# Patient Record
Sex: FEMALE | Race: WHITE | Employment: OTHER | URBAN - METROPOLITAN AREA
[De-identification: names, ages, dates, MRNs, and addresses within clinical notes are randomized per-mention and may not be internally consistent; named-entity substitution may affect disease eponyms.]

---

## 2017-04-20 ENCOUNTER — HOSPITAL ENCOUNTER (EMERGENCY)
Age: 82
Discharge: HOME | End: 2017-04-20
Attending: EMERGENCY MEDICINE
Payer: MEDICARE

## 2017-04-20 ENCOUNTER — APPOINTMENT (OUTPATIENT)
Dept: GENERAL RADIOLOGY | Age: 82
End: 2017-04-20
Attending: EMERGENCY MEDICINE
Payer: MEDICARE

## 2017-04-20 VITALS
TEMPERATURE: 97.8 F | RESPIRATION RATE: 16 BRPM | BODY MASS INDEX: 25.16 KG/M2 | HEIGHT: 63 IN | OXYGEN SATURATION: 95 % | SYSTOLIC BLOOD PRESSURE: 172 MMHG | WEIGHT: 141.98 LBS | DIASTOLIC BLOOD PRESSURE: 75 MMHG | HEART RATE: 59 BPM
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
## 2017-04-20 DIAGNOSIS — S80.12XA CONTUSION OF LEFT LOWER LEG, INITIAL ENCOUNTER: ICD-10-CM

## 2017-04-20 PROCEDURE — 99282 EMERGENCY DEPT VISIT SF MDM: CPT

## 2017-04-20 PROCEDURE — 73590 X-RAY EXAM OF LOWER LEG: CPT

## 2017-04-20 RX ORDER — ATORVASTATIN CALCIUM 10 MG/1
10 TABLET, FILM COATED ORAL DAILY
COMMUNITY

## 2017-04-20 RX ORDER — VALSARTAN 320 MG/1
320 TABLET ORAL DAILY
COMMUNITY
End: 2022-05-13

## 2017-04-20 NOTE — ED PROVIDER NOTES
HPI Comments: Possibly tripped on rug. Did not get dizzy or have chest pain. No LOC. Patient is a 80 y.o. female presenting with fall. The history is provided by the patient. Fall   The accident occurred 3 to 5 hours ago. The fall occurred while walking. She fell from a height of ground level. She landed on carpet. There was no blood loss. Point of impact: left leg. Pain location: left leg. The pain is mild. She was ambulatory at the scene. There was no entrapment after the fall. There was no drug use involved in the accident. There was no alcohol use involved in the accident. Pertinent negatives include no fever, no numbness, no abdominal pain, no nausea, no vomiting, no headaches, no loss of consciousness, no tingling and no laceration. The risk factors include being elderly. The symptoms are aggravated by activity. She has tried nothing for the symptoms. Past Medical History:   Diagnosis Date    Colitis     Hypertension        Past Surgical History:   Procedure Laterality Date    HX DILATION AND CURETTAGE           History reviewed. No pertinent family history. Social History     Social History    Marital status:      Spouse name: N/A    Number of children: N/A    Years of education: N/A     Occupational History    Not on file. Social History Main Topics    Smoking status: Former Smoker    Smokeless tobacco: Not on file    Alcohol use No    Drug use: Not on file    Sexual activity: Not on file     Other Topics Concern    Not on file     Social History Narrative         ALLERGIES: Macrobid [nitrofurantoin monohyd/m-cryst] and Pcn [penicillins]    Review of Systems   Constitutional: Negative for chills and fever. HENT: Negative for ear pain and sore throat. Eyes: Negative for pain. Respiratory: Negative for chest tightness and shortness of breath. Cardiovascular: Negative for chest pain and leg swelling.    Gastrointestinal: Negative for abdominal pain, nausea and vomiting. Genitourinary: Negative for dysuria and flank pain. Musculoskeletal: Negative for back pain. Skin: Negative for rash. Neurological: Negative for tingling, loss of consciousness, numbness and headaches. All other systems reviewed and are negative. There were no vitals filed for this visit. Physical Exam   Constitutional: She appears well-developed and well-nourished. HENT:   Head: Normocephalic and atraumatic. Mouth/Throat: Oropharynx is clear and moist.   Eyes: Conjunctivae and EOM are normal. Pupils are equal, round, and reactive to light. No scleral icterus. Neck: Neck supple. No tracheal deviation present. Cardiovascular: Normal rate, regular rhythm and normal heart sounds. Pulmonary/Chest: Effort normal and breath sounds normal. No respiratory distress. Abdominal: Soft. She exhibits no distension. There is no tenderness. There is no rebound and no guarding. Genitourinary:   Genitourinary Comments: deferred   Musculoskeletal: She exhibits no edema. Mild tenderness distal left leg   Neurological: She is alert. She exhibits normal muscle tone. Motor and sensation intact in lower extremities   Skin: Skin is warm and dry. No laceration noted. Psychiatric: She has a normal mood and affect. Nursing note and vitals reviewed. MDM  Number of Diagnoses or Management Options  Contusion of left lower leg, initial encounter:   Fall, initial encounter:   Diagnosis management comments: 80year old female presents after mechanical ground level fall.   Diff dx: contusion, fracture    X-ray NAD    A/P: fall, contusion- tylenol prn, ice, f/u with PCP for re-check and elevated BP  Return to the emergency department for new/ worsening symptoms or other concerns         Patient Progress  Patient progress: stable    ED Course       Procedures

## 2017-04-20 NOTE — ED NOTES
The patient was discharged home by Dr Lindsey Restrepo in stable condition. The patient is alert and oriented, in no respiratory distress and discharge vital signs obtained. The patient's diagnosis, condition and treatment were explained. The patient expressed understanding. A discharge plan has been developed. A  was not involved in the process. Aftercare instructions were given. Pt discharged from the ED via w/c by Ayaz Castro RN with family.

## 2017-04-20 NOTE — DISCHARGE INSTRUCTIONS
Preventing Falls: Care Instructions  Your Care Instructions  Getting around your home safely can be a challenge if you have injuries or health problems that make it easy for you to fall. Loose rugs and furniture in walkways are among the dangers for many older people who have problems walking or who have poor eyesight. People who have conditions such as arthritis, osteoporosis, or dementia also have to be careful not to fall. You can make your home safer with a few simple measures. Follow-up care is a key part of your treatment and safety. Be sure to make and go to all appointments, and call your doctor if you are having problems. It's also a good idea to know your test results and keep a list of the medicines you take. How can you care for yourself at home? Taking care of yourself  · You may get dizzy if you do not drink enough water. To prevent dehydration, drink plenty of fluids, enough so that your urine is light yellow or clear like water. Choose water and other caffeine-free clear liquids. If you have kidney, heart, or liver disease and have to limit fluids, talk with your doctor before you increase the amount of fluids you drink. · Exercise regularly to improve your strength, muscle tone, and balance. Walk if you can. Swimming may be a good choice if you cannot walk easily. · Have your vision and hearing checked each year or any time you notice a change. If you have trouble seeing and hearing, you might not be able to avoid objects and could lose your balance. · Know the side effects of the medicines you take. Ask your doctor or pharmacist whether the medicines you take can affect your balance. Sleeping pills or sedatives can affect your balance. · Limit the amount of alcohol you drink. Alcohol can impair your balance and other senses. · Ask your doctor whether calluses or corns on your feet need to be removed.  If you wear loose-fitting shoes because of calluses or corns, you can lose your balance and fall. · Talk to your doctor if you have numbness in your feet. Preventing falls at home  · Remove raised doorway thresholds, throw rugs, and clutter. Repair loose carpet or raised areas in the floor. · Move furniture and electrical cords to keep them out of walking paths. · Use nonskid floor wax, and wipe up spills right away, especially on ceramic tile floors. · If you use a walker or cane, put rubber tips on it. If you use crutches, clean the bottoms of them regularly with an abrasive pad, such as steel wool. · Keep your house well lit, especially Eleonore Guppy, and outside walkways. Use night-lights in areas such as hallways and bathrooms. Add extra light switches or use remote switches (such as switches that go on or off when you clap your hands) to make it easier to turn lights on if you have to get up during the night. · Install sturdy handrails on stairways. · Move items in your cabinets so that the things you use a lot are on the lower shelves (about waist level). · Keep a cordless phone and a flashlight with new batteries by your bed. If possible, put a phone in each of the main rooms of your house, or carry a cell phone in case you fall and cannot reach a phone. Or, you can wear a device around your neck or wrist. You push a button that sends a signal for help. · Wear low-heeled shoes that fit well and give your feet good support. Use footwear with nonskid soles. Check the heels and soles of your shoes for wear. Repair or replace worn heels or soles. · Do not wear socks without shoes on wood floors. · Walk on the grass when the sidewalks are slippery. If you live in an area that gets snow and ice in the winter, sprinkle salt on slippery steps and sidewalks. Preventing falls in the bath  · Install grab bars and nonskid mats inside and outside your shower or tub and near the toilet and sinks. · Use shower chairs and bath benches.   · Use a hand-held shower head that will allow you to sit while showering. · Get into a tub or shower by putting the weaker leg in first. Get out of a tub or shower with your strong side first.  · Repair loose toilet seats and consider installing a raised toilet seat to make getting on and off the toilet easier. · Keep your bathroom door unlocked while you are in the shower. Where can you learn more? Go to http://josh-merari.info/. Enter 0476 [id number] in the search box to learn more about \"Preventing Falls: Care Instructions. \"  Current as of: August 4, 2016  Content Version: 11.2  © 8189-7982 [employer]. Care instructions adapted under license by [employer] (which disclaims liability or warranty for this information). If you have questions about a medical condition or this instruction, always ask your healthcare professional. [place] [address] any warranty or liability for your use of this information. Contusion: Care Instructions  Your Care Instructions  Contusion is the medical term for a bruise. It is the result of a direct blow or an impact, such as a fall. Contusions are common sports injuries. Most people think of a bruise as a black-and-blue spot. This happens when small blood vessels get torn and leak blood under the skin. But bones, muscles, and organs can also get bruised. This may damage deep tissues but not cause a bruise you can see. The doctor will do a physical exam to find the location of your contusion. You may also have tests to make sure you do not have a more serious injury, such as a broken bone or nerve damage. These may include X-rays or other imaging tests like a CT scan or MRI. Deep-tissue contusions may cause pain and swelling. But if there is no serious damage, they will often get better in a few weeks with home treatment. The doctor has checked you carefully, but problems can develop later.  If you notice any problems or new symptoms, get medical treatment right away.  Follow-up care is a key part of your treatment and safety. Be sure to make and go to all appointments, and call your doctor if you are having problems. It's also a good idea to know your test results and keep a list of the medicines you take. How can you care for yourself at home? · Put ice or a cold pack on the sore area for 10 to 20 minutes at a time to stop swelling. Put a thin cloth between the ice pack and your skin. · Be safe with medicines. Read and follow all instructions on the label. ¨ If the doctor gave you a prescription medicine for pain, take it as prescribed. ¨ If you are not taking a prescription pain medicine, ask your doctor if you can take an over-the-counter medicine. · If you can, prop up the sore area on pillows as much as possible for the next few days. Try to keep the sore area above the level of your heart. When should you call for help? Call your doctor now or seek immediate medical care if:  · Your pain gets worse. · You have new or worse swelling. · You have tingling, weakness, or numbness in the area near the contusion. · The area near the contusion is cold or pale. Watch closely for changes in your health, and be sure to contact your doctor if:  · You do not get better as expected. Where can you learn more? Go to http://josh-merari.info/. Enter E704 in the search box to learn more about \"Contusion: Care Instructions. \"  Current as of: May 27, 2016  Content Version: 11.2  © 0518-2521 [employer]. Care instructions adapted under license by [employer] (which disclaims liability or warranty for this information). If you have questions about a medical condition or this instruction, always ask your healthcare professional. [address] any warranty or liability for your use of this information. We hope that we have addressed all of your medical concerns.  The examination and treatment you received in the Emergency Department were for an emergent problem and were not intended as complete care. It is important that you follow up with your healthcare provider(s) for ongoing care. If your symptoms worsen or do not improve as expected, and you are unable to reach your usual health care provider(s), you should return to the Emergency Department. Today's healthcare is undergoing tremendous change, and patient satisfaction surveys are one of the many tools to assess the quality of medical care. You may receive a survey from the [employer] regarding your experience in the Emergency Department. I hope that your experience has been completely positive, particularly the medical care that I provided. As such, please participate in the survey; anything less than excellent does not meet my expectations or intentions. [facility] and [facility] participate in nationally recognized quality of care measures. If your blood pressure is greater than 120/80, as reported below, we urge that you seek medical care to address the potential of high blood pressure, commonly known as hypertension. Hypertension can be hereditary or can be caused by certain medical conditions, pain, stress, or \"white coat syndrome. \"       Please make an appointment with your health care provider(s) for follow up of your Emergency Department visit. VITALS:   Patient Vitals for the past 8 hrs:   Temp Pulse Resp BP SpO2   04/20/17 1212 97.8 °F (36.6 °C) 61 16 180/86 96 %          Thank you for allowing us to provide you with medical care today. We realize that you have many choices for your emergency care needs. Please choose us in the future for any continued health care needs. Tomeka Ann, [address] [facility] Stephon Gooden: [phone]            No results found for this or any previous visit (from the past 24 hour(s)).     Billy Varner Tib/fib Lt    Result Date: 4/20/2017  EXAM:  XR TIB/FIB LT INDICATION:  fall. Acute left leg pain COMPARISON: None. FINDINGS: AP and lateral  views of the left tibia and fibula demonstrate no fracture or other acute osseous, articular or soft tissue abnormality. IMPRESSION: No acute abnormality.

## 2017-04-20 NOTE — ED TRIAGE NOTES
Patient was walking outside with her walker and lost her footing and injured the front of her left ankle. Has preexisting swelling, no pain. \"I injured that ankle in the past.\" Stable on her feet upon arrival using her cane. When she fell today she says that she \"eased down on the rug on her knees and her forehead. \" No other complaints. No loss of consciousness.

## 2017-06-02 ENCOUNTER — HOSPITAL ENCOUNTER (EMERGENCY)
Age: 82
Discharge: HOME | End: 2017-06-02
Attending: STUDENT IN AN ORGANIZED HEALTH CARE EDUCATION/TRAINING PROGRAM
Payer: MEDICARE

## 2017-06-02 VITALS
HEIGHT: 64 IN | TEMPERATURE: 97.7 F | DIASTOLIC BLOOD PRESSURE: 73 MMHG | SYSTOLIC BLOOD PRESSURE: 164 MMHG | RESPIRATION RATE: 18 BRPM | HEART RATE: 60 BPM | WEIGHT: 154.76 LBS | OXYGEN SATURATION: 95 % | BODY MASS INDEX: 26.42 KG/M2

## 2017-06-02 DIAGNOSIS — G60.9 IDIOPATHIC PERIPHERAL NEUROPATHY: Primary | ICD-10-CM

## 2017-06-02 PROCEDURE — L3809 WHFO W/O JOINTS PRE OTS: HCPCS

## 2017-06-02 PROCEDURE — 99283 EMERGENCY DEPT VISIT LOW MDM: CPT

## 2017-06-02 NOTE — ED NOTES
Pt was discharged and given instructions by [facility] Nicki RYDER NP . Pt verbalized good understanding of all discharge instructions, and F/U care. All questions answered. Pt in stable condition on discharge.

## 2017-06-02 NOTE — DISCHARGE INSTRUCTIONS
We hope that we have addressed all of your medical concerns. The examination and treatment you received in the Emergency Department were for an emergent problem and were not intended as complete care. It is important that you follow up with your healthcare provider(s) for ongoing care. If your symptoms worsen or do not improve as expected, and you are unable to reach your usual health care provider(s), you should return to the Emergency Department. Today's healthcare is undergoing tremendous change, and patient satisfaction surveys are one of the many tools to assess the quality of medical care. You may receive a survey from the [employer] organization regarding your experience in the Emergency Department. I hope that your experience has been completely positive, particularly the medical care that I provided. As such, please participate in the survey; anything less than excellent does not meet my expectations or intentions. [facility] and 8 [facility]3] participate in nationally recognized quality of care measures. If your blood pressure is greater than 120/80, as reported below, we urge that you seek medical care to address the potential of high blood pressure, commonly known as hypertension. Hypertension can be hereditary or can be caused by certain medical conditions, pain, stress, or \"white coat syndrome. \"       Please make an appointment with your health care provider(s) for follow up of your Emergency Department visit. VITALS:   Patient Vitals for the past 8 hrs:   Temp Pulse Resp BP SpO2   06/02/17 1435 97.7 °F (36.5 °C) 65 19 195/87 95 %          Thank you for allowing us to provide you with medical care today. We realize that you have many choices for your emergency care needs. Please choose us in the future for any continued health care needs. Regards,           B[facility]heva Hamilton [facility], [address] [place] [address] [address]   Office: [phone]            No results found for this or any previous visit (from the past 24 hour(s)). No results found. Carpal Tunnel Syndrome: Care Instructions  Your Care Instructions    Carpal tunnel syndrome is a nerve problem. It can cause tingling, numbness, weakness, or pain in the fingers, thumb, and hand. The median nerve and several tough tissues called tendons run through a space in the wrist called the carpal tunnel. The repeated hand motions used in work and some hobbies and sports can put pressure on the nerve. Pregnancy and several conditions, including diabetes, arthritis, and an underactive thyroid, also can cause carpal tunnel syndrome. You may be able to limit an activity or do it differently to reduce your symptoms. You also can take other steps to feel better. If your symptoms are mild, 1 to 2 weeks of home treatment are likely to ease your pain. Surgery is needed only if other treatments do not work. Follow-up care is a key part of your treatment and safety. Be sure to make and go to all appointments, and call your doctor if you are having problems. It's also a good idea to know your test results and keep a list of the medicines you take. How can you care for yourself at home? · If possible, stop or reduce the activity that causes your symptoms. If you cannot stop the activity, take frequent breaks to rest and stretch or change hand positions to do a task. Try switching hands, such as when using a computer mouse. · Try to avoid bending or twisting your wrists. · Ask your doctor if you can take an over-the-counter pain medicine, such as acetaminophen (Tylenol), ibuprofen (Advil, Motrin), or naproxen (Aleve). Be safe with medicines. Read and follow all instructions on the label. · If your doctor prescribes corticosteroid medicine to help reduce pain and swelling, take it exactly as prescribed. Call your doctor if you think you are having a problem with your medicine.   · Put ice or a cold pack on your wrist for 10 to 20 minutes at a time to ease pain. Put a thin cloth between the ice and your skin. · If your doctor or your physical or occupational therapist tells you to wear a wrist splint, wear it as directed to keep your wrist in a neutral position. This also eases pressure on your median nerve. · Ask your doctor whether you should have physical or occupational therapy to learn how to do tasks differently. · Try a yoga class to stretch your muscles and build strength in your hands and wrists. Yoga has been shown to ease carpal tunnel symptoms. To prevent carpal tunnel  · When working at a computer, keep your hands and wrists in line with your forearms. Hold your elbows close to your sides. Take a break every 10 to 15 minutes. · Try these exercises:  ¨ Warm up: Rotate your wrist up, down, and from side to side. Repeat this 4 times. Stretch your fingers far apart, relax them, then stretch them again. Repeat 4 times. Stretch your thumb by pulling it back gently, holding it, and then releasing it. Repeat 4 times. ¨ Prayer stretch: Start with your palms together in front of your chest just below your chin. Slowly lower your hands toward your waistline while keeping your hands close to your stomach and your palms together until you feel a mild to moderate stretch under your forearms. Hold for 10 to 20 seconds. Repeat 4 times. ¨ Wrist flexor stretch: Hold your arm in front of you with your palm up. Bend your wrist, pointing your hand toward the floor. With your other hand, gently bend your wrist further until you feel a mild to moderate stretch in your forearm. Hold for 10 to 20 seconds. Repeat 4 times. ¨ Wrist extensor stretch: Repeat the steps for the wrist flexor stretch, but begin with your extended hand palm down. · Squeeze a rubber exercise ball several times a day to keep your hands and fingers strong. · Avoid holding objects (such as a book) in one position for a long time. When possible, use your whole hand to grasp an object. Using just the thumb and index finger can put stress on the wrist.  · Do not smoke. It can make this condition worse by reducing blood flow to the median nerve. If you need help quitting, talk to your doctor about stop-smoking programs and medicines. These can increase your chances of quitting for good. When should you call for help? Watch closely for changes in your health, and be sure to contact your doctor if:  · Your pain or other problems do not get better with home care. · You want more information about physical or occupational therapy. · You have side effects of your corticosteroid medicine, such as:  ¨ Weight gain. ¨ Mood changes. ¨ Trouble sleeping. ¨ Bruising easily. · You have any other problems with your medicine. Where can you learn more? Go to http://josh-merari.info/. Enter R432 in the search box to learn more about \"Carpal Tunnel Syndrome: Care Instructions. \"  Current as of: May 23, 2016  Content Version: 11.2  © 1790-1452 [employer]. Care instructions adapted under license by [employer] (which disclaims liability or warranty for this information). If you have questions about a medical condition or this instruction, always ask your healthcare professional. [address] any warranty or liability for your use of this information. Carpal Tunnel Syndrome: Exercises  Your Care Instructions  Here are some examples of typical rehabilitation exercises for your condition. Start each exercise slowly. Ease off the exercise if you start to have pain. Your doctor or your physical or occupational therapist will tell you when you can start these exercises and which ones will work best for you. How to do the exercises  Note: When you no longer have pain or numbness, you can do exercises to help prevent carpal tunnel syndrome from coming back.  Do not do any stretch or movement that is uncomfortable or painful. Warm-up stretches  1. Rotate your wrist up, down, and from side to side. Repeat 4 times. 2. Stretch your fingers far apart. Relax them, and then stretch them again. Repeat 4 times. 3. Stretch your thumb by pulling it back gently, holding it, and then releasing it. Repeat 4 times. Prayer stretch    1. Start with your palms together in front of your chest just below your chin. 2. Slowly lower your hands toward your waistline, keeping your hands close to your stomach and your palms together until you feel a mild to moderate stretch under your forearms. 3. Hold for at least 15 to 30 seconds. Repeat 2 to 4 times. Wrist flexor stretch    1. Extend your arm in front of you with your palm up. 2. Bend your wrist, pointing your hand toward the floor. 3. With your other hand, gently bend your wrist farther until you feel a mild to moderate stretch in your forearm. 4. Hold for at least 15 to 30 seconds. Repeat 2 to 4 times. Wrist extensor stretch    Repeat steps 1 through 4 of the stretch above, but begin with your extended hand palm down. Follow-up care is a key part of your treatment and safety. Be sure to make and go to all appointments, and call your doctor if you are having problems. It's also a good idea to know your test results and keep a list of the medicines you take. Where can you learn more? Go to http://josh-merari.info/. Enter I035 in the search box to learn more about \"Carpal Tunnel Syndrome: Exercises. \"  Current as of: May 23, 2016  Content Version: 11.2  © 1163-9152 [employer]. Care instructions adapted under license by [employer] (which disclaims liability or warranty for this information). If you have questions about a medical condition or this instruction, always ask your healthcare professional. [address] any warranty or liability for your use of this information.

## 2017-06-02 NOTE — ED TRIAGE NOTES
Patient ambulatory to ED treatment area with steady gait using personal cane for complaint of \"this morning I woke up and my left two finger were numb. \" Patient says that her 4th and 5th fingers are numb feeling. Patient able to bend fingers. Patient denies any injury to the hand or fingers. Patient denies ever having anything like this in the past. Capillary refill less than 3 seconds. Radial pulse present and palpable.

## 2017-06-02 NOTE — ED PROVIDER NOTES
HPI Comments: Patient is an 77-year-old  female who comes to the ED today with left fourth and fifth finger numbness and tingling. States the tingling began this morning upon waking. States she has not had any fevers or chills. No trauma. Does note she played bingo for several hours yesterday afternoon resting her elbows on the table. She also notes that she sometimes lives with her hands under her face, and props her head on her hands when playing games or doing a puzzle. Medication: Reviewed no changes    No current facility-administered medications on file prior to encounter. Current Outpatient Prescriptions on File Prior to Encounter:  atorvastatin (LIPITOR) 10 mg tablet, Take 10 mg by mouth daily. , Disp: , Rfl:   METFORMIN HCL (METFORMIN PO), Take 2 Caps by mouth at bedtime as directed for dose pack. , Disp: , Rfl:   valsartan (DIOVAN) 320 mg tablet, Take 320 mg by mouth daily. , Disp: , Rfl:   levothyroxine (SYNTHROID) 50 mcg tablet, Take  by mouth Daily (before breakfast). , Disp: , Rfl:   METOPROLOL SUCCINATE PO, Take 100 mg by mouth daily. , Disp: , Rfl:   esomeprazole (NEXIUM) 40 mg capsule, Take  by mouth daily. , Disp: , Rfl:   furosemide (LASIX) 20 mg tablet, Take  by mouth daily. , Disp: , Rfl:   aspirin delayed-release (ECOTRIN LOW STRENGTH) 81 mg tablet, Take  by mouth daily. , Disp: , Rfl:   ezetimibe (ZETIA) 10 mg tablet, Take  by mouth., Disp: , Rfl:   METOPROLOL SUCCINATE PO, Take 50 mg by mouth nightly., Disp: , Rfl:   amLODIPine-Olmesartan (GERARD) 5-40 mg tab, Take  by mouth nightly., Disp: , Rfl:   folic acid 333 mcg tablet, Take 400 mcg by mouth daily. , Disp: , Rfl:   Calcium Carbonate-Vit D3-Min (CALTRATE 600+D PLUS MINERALS) 600 mg calcium- 400 unit tab, Take  by mouth., Disp: , Rfl:       Past Medical History:  No date: Colitis  No date: Hypertension      Patient is a 80 y.o. female presenting with other event. The history is provided by the patient. Other   This is a new problem. The current episode started 6 to 12 hours ago. The problem occurs constantly. The problem has not changed since onset. Pertinent negatives include no chest pain, no abdominal pain, no headaches and no shortness of breath. Nothing aggravates the symptoms. Nothing relieves the symptoms. She has tried nothing for the symptoms. Past Medical History:   Diagnosis Date    Colitis     Hypertension        Past Surgical History:   Procedure Laterality Date    HX DILATION AND CURETTAGE           History reviewed. No pertinent family history. Social History     Social History    Marital status:      Spouse name: N/A    Number of children: N/A    Years of education: N/A     Occupational History    Not on file. Social History Main Topics    Smoking status: Former Smoker    Smokeless tobacco: Not on file    Alcohol use No    Drug use: Not on file    Sexual activity: Not on file     Other Topics Concern    Not on file     Social History Narrative         ALLERGIES: Macrobid [nitrofurantoin monohyd/m-cryst] and Pcn [penicillins]    Review of Systems   Constitutional: Negative. HENT: Negative. Eyes: Negative. Respiratory: Negative for shortness of breath. Cardiovascular: Negative for chest pain. Gastrointestinal: Negative for abdominal pain. Endocrine: Negative. Genitourinary: Negative. Musculoskeletal: Negative. Allergic/Immunologic: Negative. Neurological: Positive for numbness. Negative for weakness and headaches. Hematological: Negative. Psychiatric/Behavioral: Negative. Vitals:    06/02/17 1435   BP: 195/87   Pulse: 65   Resp: 19   Temp: 97.7 °F (36.5 °C)   SpO2: 95%   Weight: 70.2 kg (154 lb 12.2 oz)   Height: 5' 4\" (1.626 m)            Physical Exam   Constitutional: She is oriented to person, place, and time. She appears well-developed and well-nourished. HENT:   Head: Normocephalic and atraumatic. Neck: Neck supple.    No cervical pain or tenderness Abdominal: Soft. Musculoskeletal:        Left hand: She exhibits decreased range of motion. She exhibits no tenderness, no bony tenderness, normal capillary refill, no deformity and no swelling. Decreased sensation noted. Decreased sensation is present in the ulnar distribution. Normal strength noted. Hands:  Lymphadenopathy:     She has no cervical adenopathy. Neurological: She is alert and oriented to person, place, and time. She has normal strength. No cranial nerve deficit. Coordination normal. GCS eye subscore is 4. GCS verbal subscore is 5. GCS motor subscore is 6. Skin: Skin is warm and dry. Nursing note and vitals reviewed. 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      Discussed with and seen by Dr. Zia Qureshi NP  06/02/17  3:11 PM      ED Course       Procedures

## 2018-04-20 ENCOUNTER — HOSPITAL ENCOUNTER (OUTPATIENT)
Dept: GENERAL RADIOLOGY | Age: 83
Discharge: HOME | End: 2018-04-20
Attending: INTERNAL MEDICINE
Payer: MEDICARE

## 2018-04-20 DIAGNOSIS — R13.14 DYSPHAGIA, PHARYNGOESOPHAGEAL PHASE: ICD-10-CM

## 2018-04-20 PROCEDURE — 74220 X-RAY XM ESOPHAGUS 1CNTRST: CPT

## 2018-05-03 ENCOUNTER — HOSPITAL ENCOUNTER (OUTPATIENT)
Age: 83
Setting detail: OUTPATIENT SURGERY
Discharge: HOME | End: 2018-05-03
Attending: SPECIALIST | Admitting: SPECIALIST
Payer: MEDICARE

## 2018-05-03 VITALS
WEIGHT: 154 LBS | DIASTOLIC BLOOD PRESSURE: 85 MMHG | SYSTOLIC BLOOD PRESSURE: 166 MMHG | OXYGEN SATURATION: 97 % | BODY MASS INDEX: 26.29 KG/M2 | HEIGHT: 64 IN | RESPIRATION RATE: 18 BRPM | HEART RATE: 67 BPM

## 2018-05-03 PROCEDURE — 76040000019: Performed by: SPECIALIST

## 2018-05-03 PROCEDURE — 74011000250 HC RX REV CODE- 250: Performed by: SPECIALIST

## 2018-05-03 RX ORDER — LIDOCAINE HYDROCHLORIDE 20 MG/ML
JELLY TOPICAL ONCE
Status: COMPLETED | OUTPATIENT
Start: 2018-05-03 | End: 2018-05-03

## 2018-05-03 RX ADMIN — LIDOCAINE HYDROCHLORIDE 5 ML: 20 JELLY TOPICAL at 12:45

## 2018-05-03 NOTE — DISCHARGE INSTRUCTIONS
Guru Min  [id number]  1931      MANOMETRY DISCHARGE INSTRUCTION    You may resume your regular diet as tolerated. You may resume your normal daily activities. If you develop a sore throat- throat lozenges or warm salt water gargles will help. Call your Physician if you have any complications or questions. [employer] [employer]    Thank you for requesting access to [employer]. Please follow the instructions below to securely access and download your online medical record. [employer] allows you to send messages to your doctor, view your test results, renew your prescriptions, schedule appointments, and more. How Do I Sign Up? 1. In your internet browser, go to [email or web address]  2. Click on the First Time User? Click Here link in the Sign In box. You will be redirect to the New Member Sign Up page. 3. Enter your [employer] Access Code exactly as it appears below. You will not need to use this code after youve completed the sign-up process. If you do not sign up before the expiration date, you must request a new code. [employer] Access Code: [email or web address]  Expires: 2018 11:47 AM (This is the date your [employer] access code will )    4. Enter the last four digits of your Social Security Number (xxxx) and Date of Birth (mm/dd/yyyy) as indicated and click Submit. You will be taken to the next sign-up page. 5. Create a [employer] ID. This will be your [employer] login ID and cannot be changed, so think of one that is secure and easy to remember. 6. Create a [employer] password. You can change your password at any time. 7. Enter your Password Reset Question and Answer. This can be used at a later time if you forget your password. 8. Enter your e-mail address. You will receive e-mail notification when new information is available in [address] 9. Click Sign Up. You can now view and download portions of your medical record.   10. Click the Download Summary menu link to download a portable copy of your medical information. Additional Information    If you have questions, please visit the Frequently Asked Questions section of the [employer] website at [email or web address] Remember, [employer] is NOT to be used for urgent needs. For medical emergencies, dial 911.

## 2018-05-03 NOTE — IP AVS SNAPSHOT
[address] [place] [facility]. 
[phone] Patient: Sinan Sanchez MRN: [id number]:[id number] About your hospitalization You were admitted on:  May 3, 2018 You last received care in the:  [facility] ENDOSCOPY You were discharged on:  May 3, 2018 Why you were hospitalized Your primary diagnosis was:  Not on File Follow-up Information None Discharge Orders None A check ronaldo indicates which time of day the medication should be taken. My Medications ASK your doctor about these medications Instructions Each Dose to Equal  
 Morning Noon Evening Bedtime  
 atorvastatin 10 mg tablet Commonly known as:  LIPITOR Your last dose was: Your next dose is: Take 10 mg by mouth daily. 10 mg  
    
   
   
   
  
 GERARD 5-40 mg Tab Generic drug:  amLODIPine-Olmesartan Your last dose was: Your next dose is: Take  by mouth nightly. CALTRATE 600+D PLUS MINERALS 600 mg calcium- 400 unit Tab Generic drug:  Calcium Carbonate-Vit D3-Min Your last dose was: Your next dose is: Take  by mouth. ECOTRIN LOW STRENGTH 81 mg tablet Generic drug:  aspirin delayed-release Your last dose was: Your next dose is: Take  by mouth daily. folic acid 777 mcg tablet Your last dose was: Your next dose is: Take 400 mcg by mouth daily. 400 mcg LASIX 20 mg tablet Generic drug:  furosemide Your last dose was: Your next dose is: Take  by mouth daily. levothyroxine 50 mcg tablet Commonly known as:  SYNTHROID Your last dose was: Your next dose is: Take  by mouth Daily (before breakfast). METFORMIN PO Your last dose was: Your next dose is: Take 2 Caps by mouth at bedtime as directed for dose pack. 2 Cap * METOPROLOL SUCCINATE PO Your last dose was: Your next dose is: Take 100 mg by mouth daily. 100 mg  
    
   
   
   
  
 * METOPROLOL SUCCINATE PO Your last dose was: Your next dose is: Take 50 mg by mouth nightly. 50 mg NexIUM 40 mg capsule Generic drug:  esomeprazole Your last dose was: Your next dose is: Take  by mouth daily. valsartan 320 mg tablet Commonly known as:  DIOVAN Your last dose was: Your next dose is: Take 320 mg by mouth daily. 320 mg  
    
   
   
   
  
 ZETIA 10 mg tablet Generic drug:  ezetimibe Your last dose was: Your next dose is: Take  by mouth. * Notice: This list has 2 medication(s) that are the same as other medications prescribed for you. Read the directions carefully, and ask your doctor or other care provider to review them with you. Discharge Instructions Leta Jimenesas [id number] 9/13/1931 MANOMETRY DISCHARGE INSTRUCTION You may resume your regular diet as tolerated. You may resume your normal daily activities. If you develop a sore throat- throat lozenges or warm salt water gargles will help. Call your Physician if you have any complications or questions. Kovio Activation Thank you for requesting access to Kovio. Please follow the instructions below to securely access and download your online medical record. Kovio allows you to send messages to your doctor, view your test results, renew your prescriptions, schedule appointments, and more. How Do I Sign Up? 1. In your internet browser, go to [email or web address] 
 2. Click on the First Time User? Click Here link in the Sign In box. You will be redirect to the New Member Sign Up page. 3. Enter your LeTV Access Code exactly as it appears below. You will not need to use this code after youve completed the sign-up process. If you do not sign up before the expiration date, you must request a new code. LeTV Access Code: [email or web address] Expires: 2018 11:47 AM (This is the date your LeTV access code will ) 4. Enter the last four digits of your Social Security Number (xxxx) and Date of Birth (mm/dd/yyyy) as indicated and click Submit. You will be taken to the next sign-up page. 5. Create a Card[employer] Systemzt ID. This will be your LeTV login ID and cannot be changed, so think of one that is secure and easy to remember. 6. Create a LeTV password. You can change your password at any time. 7. Enter your Password Reset Question and Answer. This can be used at a later time if you forget your password. 8. Enter your e-mail address. You will receive e-mail notification when new information is available in [address] 9. Click Sign Up. You can now view and download portions of your medical record. 10. Click the Download Summary menu link to download a portable copy of your medical information. Additional Information If you have questions, please visit the Frequently Asked Questions section of the LeTV website at [email or web address] Remember, LeTV is NOT to be used for urgent needs. For medical emergencies, dial 911. Introducing [facility]! Nevaeh Ash introduces LeTV patient portal. Now you can access parts of your medical record, email your doctor's office, and request medication refills online. 1. In your internet browser, go to [email or web address] 2. Click on the First Time User? Click Here link in the Sign In box. You will see the New Member Sign Up page. 3. Enter your [employer] Access Code exactly as it appears below. You will not need to use this code after youve completed the sign-up process. If you do not sign up before the expiration date, you must request a new code. · [employer] Access Code: [email or web address] Expires: 7/16/2018 11:47 AM 
 
4. Enter the last four digits of your Social Security Number (xxxx) and Date of Birth (mm/dd/yyyy) as indicated and click Submit. You will be taken to the next sign-up page. 5. Create a WSO2t ID. This will be your [employer] login ID and cannot be changed, so think of one that is secure and easy to remember. 6. Create a WSO2t password. You can change your password at any time. 7. Enter your Password Reset Question and Answer. This can be used at a later time if you forget your password. 8. Enter your e-mail address. You will receive e-mail notification when new information is available in [address] 9. Click Sign Up. You can now view and download portions of your medical record. 10. Click the Download Summary menu link to download a portable copy of your medical information. If you have questions, please visit the Frequently Asked Questions section of the [employer] website. Remember, [employer] is NOT to be used for urgent needs. For medical emergencies, dial 911. Now available from your iPhone and Android! Introducing Eriberto Staton As a [facility] patient, I wanted to make you aware of our electronic visit tool called Eriberto Staton. [facility] 24/7 allows you to connect within minutes with a medical provider 24 hours a day, seven days a week via a mobile device or tablet or logging into a secure website from your computer. You can access Eriberto Staton from anywhere in the [place].  
 
A virtual visit might be right for you when you have a simple condition and feel like you just dont want to get out of bed, or cant get away from work for an appointment, when your regular [facility] provider is not available (evenings, weekends or holidays), or when youre out of town and need minor care. Electronic visits cost only $49 and if the [facility] 24/7 provider determines a prescription is needed to treat your condition, one can be electronically transmitted to a nearby pharmacy*. Please take a moment to enroll today if you have not already done so. The enrollment process is free and takes just a few minutes. To enroll, please download the [employer] 24/7 afia to your tablet or phone, or visit [email or web address] to enroll on your computer. And, as an [address] [place] [address] patient with a [employer] account, the results of your visits will be scanned into your electronic medical record and your primary care provider will be able to view the scanned results. We urge you to continue to see your regular [facility] provider for your ongoing medical care. And while your primary care provider may not be the one available when you seek a [facility] virtual visit, the peace of mind you get from getting a real diagnosis real time can be priceless. For more information on [employer], view our Frequently Asked Questions (FAQs) at [email or web address]. Sincerely, 
 
Lauren Jimenez MD 
Chief Medical Officer [address] [place] [address] *:  certain medications cannot be prescribed via [facility] Providers Seen During Your Hospitalization Provider Specialty Primary office phone Luis Miguel Harris MD Gastroenterology [phone] Your Primary Care Physician (PCP) Primary Care Physician Office Phone Office Fax [place] [phone] You are allergic to the following Allergen Reactions Macrobid (Nitrofurantoin Monohyd/M-Cryst) Anaphylaxis Pcn (Penicillins) Hives Recent Documentation Height Weight Breastfeeding? BMI OB Status Smoking Status 1.626 m 69.9 kg No 26.43 kg/m2 Postmenopausal Former Smoker Emergency Contacts Name Discharge Info Relation Home Work Mobile Aaliyah Roper DISCHARGE CAREGIVER [3] Daughter [21]   [phone] Patient Belongings The following personal items are in your possession at time of discharge: 
  Dental Appliances: None  Visual Aid: Glasses, With patient Please provide this summary of care documentation to your next provider. Signatures-by signing, you are acknowledging that this After Visit Summary has been reviewed with you and you have received a copy. Patient Signature:  ____________________________________________________________ Date:  ____________________________________________________________  
  
[facility] Provider Signature:  ____________________________________________________________ Date:  ____________________________________________________________

## 2018-05-03 NOTE — IP AVS SNAPSHOT
[address] [facility] 
[phone] Patient: Carola Taylor MRN: [id number] IWB:5/76/9475 A check ronaldo indicates which time of day the medication should be taken. My Medications ASK your doctor about these medications Instructions Each Dose to Equal  
 Morning Noon Evening Bedtime  
 atorvastatin 10 mg tablet Commonly known as:  LIPITOR Your last dose was: Your next dose is: Take 10 mg by mouth daily. 10 mg  
    
   
   
   
  
 GERARD 5-40 mg Tab Generic drug:  amLODIPine-Olmesartan Your last dose was: Your next dose is: Take  by mouth nightly. CALTRATE 600+D PLUS MINERALS 600 mg calcium- 400 unit Tab Generic drug:  Calcium Carbonate-Vit D3-Min Your last dose was: Your next dose is: Take  by mouth. ECOTRIN LOW STRENGTH 81 mg tablet Generic drug:  aspirin delayed-release Your last dose was: Your next dose is: Take  by mouth daily. folic acid 416 mcg tablet Your last dose was: Your next dose is: Take 400 mcg by mouth daily. 400 mcg LASIX 20 mg tablet Generic drug:  furosemide Your last dose was: Your next dose is: Take  by mouth daily. levothyroxine 50 mcg tablet Commonly known as:  SYNTHROID Your last dose was: Your next dose is: Take  by mouth Daily (before breakfast). METFORMIN PO Your last dose was: Your next dose is: Take 2 Caps by mouth at bedtime as directed for dose pack. 2 Cap * METOPROLOL SUCCINATE PO Your last dose was: Your next dose is: Take 100 mg by mouth daily.   
 100 mg  
    
   
   
 * METOPROLOL SUCCINATE PO Your last dose was: Your next dose is: Take 50 mg by mouth nightly. 50 mg NexIUM 40 mg capsule Generic drug:  esomeprazole Your last dose was: Your next dose is: Take  by mouth daily. valsartan 320 mg tablet Commonly known as:  DIOVAN Your last dose was: Your next dose is: Take 320 mg by mouth daily. 320 mg  
    
   
   
   
  
 ZETIA 10 mg tablet Generic drug:  ezetimibe Your last dose was: Your next dose is: Take  by mouth. * Notice: This list has 2 medication(s) that are the same as other medications prescribed for you. Read the directions carefully, and ask your doctor or other care provider to review them with you.

## 2018-05-08 NOTE — OP NOTES
[address]  OPERATIVE REPORT    Nneka Miller  MR#: [id number]  : 1931  ACCOUNT #: [de-identified]   DATE OF SERVICE: 2018    PROCEDURES PLANNED:  1. High resolution esophageal manometry. 2.  Impedance esophageal manometry. PREPROCEDURE DIAGNOSIS:  Dysphagia. POSTPROCEDURE DIAGNOSES:  1. Esophagogastric junction outflow obstruction. 2.  Some instances of intact or weak peristalsis. 3.  All impedance boluses emptied completely. SPECIMENS REMOVED:  None. ANESTHESIA:  Topical.    ESTIMATED BLOOD LOSS:  None. COMPLICATIONS:  None. IMPLANTS:  None. ASSISTANT:  Eze Esparza RN    SURGEON:  Fabio Ann MD    DESCRIPTION OF PROCEDURE:  High-resolution anorectal manometry was performed by the nursing staff with subsequent interpretation by Dr. Ainsley Keith. The lower esophageal sphincter is at 43.1 cm and for a distance of 1.8 cm distally. Lower esophageal sphincter pressures are normal at rest:  Respiratory minimum 24 and respiratory mean 36.9. Residual pressure after swallowing is elevated at 18.2 (less than 15). This is esophagogastric junction outflow obstruction. The upper esophageal sphincter pressures are not reported here. This is not a reliable test for measurement or interpretation of clinical upper esophageal sphincter disorders. Wet swallows were then administered. All are peristaltic by standard criteria. The amplitude in the distal esophagus is normal at 1-1.4. The wave duration is normal at 4.7 seconds. The velocity is normal at 4.5 cm per second. High resolution scoring is as follows:  DCI normal 3097.5. Contractile front velocity normal 5.2. Intrabolus pressure at LES normal 7.9, intrabolus pressure average max body of the esophagus elevated at 26.5 (less than 17). Olean scoring is as follows:  Distal latency 6.9.   All other Olean scores zero (percent failed, percent pan esophageal pressurization, percent premature, percent rapid, percent large breaks, percent small breaks). Impedance manometry was performed with a flavored electrolyte solution. All impedance boluses emptied completely. COMMENT:  Esophagogastric junction outflow obstruction occurs when the integrated relaxation pressure at the lower sphincter is greater than 15 mmHg. It can be seen in infiltrative diseases including malignancy, eosinophilic esophagitis, achalasia. Consider CT chest, abdomen. Consider endoscopic ultrasound of the lower sphincter.       MD LORENA Richardson / JEFFRYE  D: 05/08/2018 14:15     T: 05/08/2018 14:40  JOB #: [id number]  CC: Keena Sosa MD  CC: Roger Venegas MD

## 2018-08-14 ENCOUNTER — APPOINTMENT (OUTPATIENT)
Dept: GENERAL RADIOLOGY | Age: 83
End: 2018-08-14
Attending: EMERGENCY MEDICINE
Payer: MEDICARE

## 2018-08-14 ENCOUNTER — HOSPITAL ENCOUNTER (EMERGENCY)
Age: 83
Discharge: HOME | End: 2018-08-14
Attending: EMERGENCY MEDICINE
Payer: MEDICARE

## 2018-08-14 VITALS
TEMPERATURE: 97.7 F | SYSTOLIC BLOOD PRESSURE: 181 MMHG | DIASTOLIC BLOOD PRESSURE: 81 MMHG | HEART RATE: 57 BPM | RESPIRATION RATE: 15 BRPM | HEIGHT: 63 IN | OXYGEN SATURATION: 95 %

## 2018-08-14 DIAGNOSIS — M54.50 ACUTE MIDLINE LOW BACK PAIN WITHOUT SCIATICA: Primary | ICD-10-CM

## 2018-08-14 PROCEDURE — 72100 X-RAY EXAM L-S SPINE 2/3 VWS: CPT

## 2018-08-14 PROCEDURE — 99282 EMERGENCY DEPT VISIT SF MDM: CPT

## 2018-08-14 NOTE — ED TRIAGE NOTES
Pt reports that while at [employer] last Sunday she went to go into the bathroom, grabbed the door handle and the handle gave way. Pt feel back and landed on a cement floor on her buttocks.

## 2018-08-14 NOTE — DISCHARGE INSTRUCTIONS
Back Pain, Emergency or Urgent Symptoms: Care Instructions  Your Care Instructions    Many people have back pain at one time or another. In most cases, pain gets better with self-care that includes over-the-counter pain medicine, ice, heat, and exercises. Unless you have symptoms of a severe injury or heart attack, you may be able to give yourself a few days before you call a doctor. But some back problems are very serious. Do not ignore symptoms that need to be checked right away. Follow-up care is a key part of your treatment and safety. Be sure to make and go to all appointments, and call your doctor if you are having problems. It's also a good idea to know your test results and keep a list of the medicines you take. How can you care for yourself at home? · Sit or lie in positions that are most comfortable and that reduce your pain. Try one of these positions when you lie down:  ¨ Lie on your back with your knees bent and supported by large pillows. ¨ Lie on the floor with your legs on the seat of a sofa or chair. Felipa Evonne on your side with your knees and hips bent and a pillow between your legs. ¨ Lie on your stomach if it does not make pain worse. · Do not sit up in bed, and avoid soft couches and twisted positions. Bed rest can help relieve pain at first, but it delays healing. Avoid bed rest after the first day. · Change positions every 30 minutes. If you must sit for long periods of time, take breaks from sitting. Get up and walk around, or lie flat. · Try using a heating pad on a low or medium setting, for 15 to 20 minutes every 2 or 3 hours. Try a warm shower in place of one session with the heating pad. You can also buy single-use heat wraps that last up to 8 hours. You can also try ice or cold packs on your back for 10 to 20 minutes at a time, several times a day. (Put a thin cloth between the ice pack and your skin.) This reduces pain and makes it easier to be active and exercise.   · Take pain medicines exactly as directed. ¨ If the doctor gave you a prescription medicine for pain, take it as prescribed. ¨ If you are not taking a prescription pain medicine, ask your doctor if you can take an over-the-counter medicine. When should you call for help? Call 911 anytime you think you may need emergency care. For example, call if:    · You are unable to move a leg at all.     · You have back pain with severe belly pain.     · You have symptoms of a heart attack. These may include:  ¨ Chest pain or pressure, or a strange feeling in the chest.  ¨ Sweating. ¨ Shortness of breath. ¨ Nausea or vomiting. ¨ Pain, pressure, or a strange feeling in the back, neck, jaw, or upper belly or in one or both shoulders or arms. ¨ Lightheadedness or sudden weakness. ¨ A fast or irregular heartbeat. After you call 911, the  may tell you to chew 1 adult-strength or 2 to 4 low-dose aspirin. Wait for an ambulance. Do not try to drive yourself.    Call your doctor now or seek immediate medical care if:    · You have new or worse symptoms in your arms, legs, chest, belly, or buttocks. Symptoms may include:  ¨ Numbness or tingling. ¨ Weakness. ¨ Pain.     · You lose bladder or bowel control.     · You have back pain and:  ¨ You have injured your back while lifting or doing some other activity. Call if the pain is severe, has not gone away after 1 or 2 days, and you cannot do your normal daily activities. ¨ You have had a back injury before that needed treatment. ¨ Your pain has lasted longer than 4 weeks. ¨ You have had weight loss you cannot explain. ¨ You have a fever. ¨ You are age 48 or older. ¨ You have cancer now or have had it before.    Watch closely for changes in your health, and be sure to contact your doctor if you are not getting better as expected. Where can you learn more? Go to http://josh-merari.info/.   Enter J964 in the search box to learn more about \"Back Pain, Emergency or Urgent Symptoms: Care Instructions. \"  Current as of: November 20, 2017  Content Version: 11.7  © 1048-2630 [employer]. Care instructions adapted under license by [employer] (which disclaims liability or warranty for this information). If you have questions about a medical condition or this instruction, always ask your healthcare professional. [place] [address] any warranty or liability for your use of this information.

## 2018-08-14 NOTE — ED NOTES
The patient was discharged home by Dr Trivedi Failing in stable condition. The patient is alert and oriented, in no respiratory distress. The patient's diagnosis, condition and treatment were explained. The patient expressed understanding. A discharge plan has been developed. A  was not involved in the process. Aftercare instructions were given. Pt discharged from the ED via w/c by this RN to the family's car.

## 2018-08-14 NOTE — ED PROVIDER NOTES
HPI         80y F here with back pain. Had a mechanical fall 2 days ago - fell backwards when she lost her balance when the bathroom door handle fell off as she was trying to open the door. Didn't hit her head or have LOC. Pain worse with activity. Pain relieved with tylenol. Feels worse when she moves to stand up. No pain at rest. No urinary complaints. No rash. She says it feels muscular in nature to her. Starts in the upper lumbar spine and goes around the sides. Past Medical History:   Diagnosis Date    Colitis     Hypertension        Past Surgical History:   Procedure Laterality Date    HX DILATION AND CURETTAGE           History reviewed. No pertinent family history. Social History     Social History    Marital status:      Spouse name: N/A    Number of children: N/A    Years of education: N/A     Occupational History    Not on file. Social History Main Topics    Smoking status: Former Smoker    Smokeless tobacco: Never Used    Alcohol use No    Drug use: Not on file    Sexual activity: Not on file     Other Topics Concern    Not on file     Social History Narrative         ALLERGIES: Macrobid [nitrofurantoin monohyd/m-cryst] and Pcn [penicillins]    Review of Systems   Review of Systems   Constitutional: (-) weight loss. HEENT: (-) stiff neck   Eyes: (-) discharge. Respiratory: (-) for cough. Cardiovascular: (-) syncope. Gastrointestinal: (-) blood in stool. Genitourinary: (-) hematuria. Musculoskeletal: (-) myalgias. Neurological: (-) seizure. Skin: (-) petechiae  Lymph/Immunologic: (-) enlarged lymph nodes  All other systems reviewed and are negative. Vitals:    08/14/18 1106   BP: 181/81   Pulse: (!) 57   Resp: 15   Temp: 97.7 °F (36.5 °C)   SpO2: 95%   Height: 5' 3\" (1.6 m)            Physical Exam Nursing note and vitals reviewed. Constitutional: oriented to person, place, and time. appears well-developed and well-nourished. No distress.   Head: Normocephalic and atraumatic. Sclera anicteric  Nose: No rhinorrhea  Mouth/Throat: Oropharynx is clear and moist. Pharynx normal  Eyes: Conjunctivae are normal. Pupils are equal, round, and reactive to light. Right eye exhibits no discharge. Left eye exhibits no discharge. Neck: Painless normal range of motion. Neck supple. No LAD. Cardiovascular: Normal rate, regular rhythm, normal heart sounds and intact distal pulses. Exam reveals no gallop and no friction rub. No murmur heard. Pulmonary/Chest:  No respiratory distress. No wheezes. No rales. No rhonchi. No increased work of breathing. No accessory muscle use. Good air exchange throughout. Abdominal: soft, non-tender, no rebound or guarding. No hepatosplenomegaly. Normal bowel sounds throughout. Back: no tenderness to palpation, no deformities, no CVA tenderness  Extremities/Musculoskeletal: Normal range of motion. no tenderness. No edema. Distal extremities are neurovasc intact. Lymphadenopathy:   No adenopathy. Neurological:  Alert and oriented to person, place, and time. Coordination normal. CN 2-12 intact. Motor and sensory function intact. Skin: Skin is warm and dry. No rash noted. No pallor. MDM Diana.Nasuti F here with back/side pain.  No pain on exam. Will check xray of the lumbar spine and reeval.      ED Course       Procedures

## 2019-07-12 ENCOUNTER — HOSPITAL ENCOUNTER (EMERGENCY)
Age: 84
Discharge: HOME | End: 2019-07-12
Attending: EMERGENCY MEDICINE
Payer: MEDICARE

## 2019-07-12 ENCOUNTER — APPOINTMENT (OUTPATIENT)
Dept: GENERAL RADIOLOGY | Age: 84
End: 2019-07-12
Attending: EMERGENCY MEDICINE
Payer: MEDICARE

## 2019-07-12 VITALS
HEIGHT: 63 IN | OXYGEN SATURATION: 95 % | TEMPERATURE: 98 F | RESPIRATION RATE: 16 BRPM | HEART RATE: 66 BPM | WEIGHT: 144 LBS | BODY MASS INDEX: 25.52 KG/M2 | DIASTOLIC BLOOD PRESSURE: 66 MMHG | SYSTOLIC BLOOD PRESSURE: 159 MMHG

## 2019-07-12 DIAGNOSIS — S93.402A SPRAIN OF LEFT ANKLE, UNSPECIFIED LIGAMENT, INITIAL ENCOUNTER: Primary | ICD-10-CM

## 2019-07-12 PROCEDURE — L1930 AFO PLASTIC: HCPCS

## 2019-07-12 PROCEDURE — 73610 X-RAY EXAM OF ANKLE: CPT

## 2019-07-12 PROCEDURE — 99283 EMERGENCY DEPT VISIT LOW MDM: CPT

## 2019-07-12 NOTE — DISCHARGE INSTRUCTIONS
Patient Education        Ankle Sprain: Care Instructions  Your Care Instructions    An ankle sprain can happen when you twist your ankle. The ligaments that support the ankle can get stretched and torn. Often the ankle is swollen and painful. Ankle sprains may take from several weeks to several months to heal. Usually, the more pain and swelling you have, the more severe your ankle sprain is and the longer it will take to heal. You can heal faster and regain strength in your ankle with good home treatment. It is very important to give your ankle time to heal completely, so that you do not easily hurt your ankle again. Follow-up care is a key part of your treatment and safety. Be sure to make and go to all appointments, and call your doctor if you are having problems. It's also a good idea to know your test results and keep a list of the medicines you take. How can you care for yourself at home? · Prop up your foot on pillows as much as possible for the next 3 days. Try to keep your ankle above the level of your heart. This will help reduce the swelling. · Follow your doctor's directions for wearing a splint or elastic bandage. Wrapping the ankle may help reduce or prevent swelling. · Your doctor may give you a splint, a brace, an air stirrup, or another form of ankle support to protect your ankle until it is healed. Wear it as directed while your ankle is healing. Do not remove it unless your doctor tells you to. After your ankle has healed, ask your doctor whether you should wear the brace when you exercise. · Put ice or cold packs on your injured ankle for 10 to 20 minutes at a time. Try to do this every 1 to 2 hours for the next 3 days (when you are awake) or until the swelling goes down. Put a thin cloth between the ice and your skin. · You may need to use crutches until you can walk without pain. If you do use crutches, try to bear some weight on your injured ankle if you can do so without pain.  This helps the ankle heal.  · Take pain medicines exactly as directed. ? If the doctor gave you a prescription medicine for pain, take it as prescribed. ? If you are not taking a prescription pain medicine, ask your doctor if you can take an over-the-counter medicine. · If you have been given ankle exercises to do at home, do them exactly as instructed. These can promote healing and help prevent lasting weakness. When should you call for help? Call your doctor now or seek immediate medical care if:    · Your pain is getting worse.     · Your swelling is getting worse.     · Your splint feels too tight or you are unable to loosen it.    Watch closely for changes in your health, and be sure to contact your doctor if:    · You are not getting better after 1 week. Where can you learn more? Go to http://josh-merari.info/. Enter S174 in the search box to learn more about \"Ankle Sprain: Care Instructions. \"  Current as of: September 20, 2018  Content Version: 11.9  © 8785-8917 [employer]. Care instructions adapted under license by [employer] (which disclaims liability or warranty for this information). If you have questions about a medical condition or this instruction, always ask your healthcare professional. [place] [address] any warranty or liability for your use of this information.

## 2019-07-12 NOTE — ED TRIAGE NOTES
Pt arrives via ems from home with c/o left ankle pain after falling when her ankle just gave out. Denies LOC, no head trauma. Pt landed on tile floor onto her knees but denies knee pain at this time.

## 2019-07-12 NOTE — ED PROVIDER NOTES
Heydi Bardales. Laura Roberts is an 80 y.o. female who presents via EMS to the ED with a c/o left ankle pain s/p GLF today. Pt currently rates her pain at 8/10 in severity and notes pain is worse with palpation of the ankle. Pt reports that she was putting away her groceries, when her left ankle \"gave out\" and she fell to her knees. Pt denies any LOC or head injury. Pt states that she is unable to bear any weight on her left foot and is unable to ambulate. Pt specifically denies chest pain, shortness of breath, n/v,d , fever, chills, numbness, tingling, abdominal pain, back pain, cough, leg swelling, dizziness or any other acute sx. Pt denies any recent travel, known sick contacts, or recent illness. PCP: Brock Bruce MD  PMHx significant for: HTN, Colitis  PSHx significant for: D&C  Social Hx: Tobacco: Former Smoker EtOH: none Illicit drug use: none    There are no further complaints or symptoms at this time. Signed by: cecil Oronaibolivia for [employer]. Brian Velázquez MD on  July 12th, 2019 at 9:41 AM.    The history is provided by the patient and medical records. No  was used. Past Medical History:   Diagnosis Date    Colitis     Hypertension        Past Surgical History:   Procedure Laterality Date    HX DILATION AND CURETTAGE           No family history on file.     Social History     Socioeconomic History    Marital status:      Spouse name: Not on file    Number of children: Not on file    Years of education: Not on file    Highest education level: Not on file   Occupational History    Not on file   Social Needs    Financial resource strain: Not on file    Food insecurity:     Worry: Not on file     Inability: Not on file    Transportation needs:     Medical: Not on file     Non-medical: Not on file   Tobacco Use    Smoking status: Former Smoker    Smokeless tobacco: Never Used   Substance and Sexual Activity    Alcohol use: No    Drug use: Not on file   [address] [place] [address] Sexual activity: Not on file   Lifestyle    Physical activity:     Days per week: Not on file     Minutes per session: Not on file    Stress: Not on file   Relationships    Social connections:     Talks on phone: Not on file     Gets together: Not on file     Attends [employer] service: Not on file     Active member of club or organization: Not on file     Attends meetings of clubs or organizations: Not on file     Relationship status: Not on file    Intimate partner violence:     Fear of current or ex partner: Not on file     Emotionally abused: Not on file     Physically abused: Not on file     Forced sexual activity: Not on file   Other Topics Concern    Not on file   Social History Narrative    Not on file         ALLERGIES: Macrobid [nitrofurantoin monohyd/m-cryst] and Pcn [penicillins]    Review of Systems   Constitutional: Negative for chills and fever. HENT: Negative for ear pain and sore throat. Eyes: Negative for pain. Respiratory: Negative for chest tightness and shortness of breath. Cardiovascular: Negative for chest pain. Gastrointestinal: Negative for abdominal pain. Genitourinary: Negative for flank pain. Musculoskeletal: Positive for arthralgias (left ankle pain). Negative for back pain. Skin: Negative for rash. Neurological: Negative for headaches. All other systems reviewed and are negative. Vitals:    07/12/19 0938   BP: 156/74   Pulse: 66   Resp: 16   Temp: 98 °F (36.7 °C)   SpO2: 96%   Weight: 65.3 kg (144 lb)   Height: 5' 3\" (1.6 m)            Physical Exam   Constitutional: No distress. HENT:   Head: Normocephalic and atraumatic. Mouth/Throat: Oropharynx is clear and moist.   Eyes: Pupils are equal, round, and reactive to light. Conjunctivae are normal. No scleral icterus. Neck: Neck supple. No tracheal deviation present. Cardiovascular: Normal rate, regular rhythm and intact distal pulses.    Pulses:       Dorsalis pedis pulses are 2+ on the right side, and 2+ on the left side. Pulmonary/Chest: Effort normal. No respiratory distress. She has no wheezes. She has no rales. Abdominal: Soft. She exhibits no distension. There is no tenderness. Genitourinary:   Genitourinary Comments: deferred   Musculoskeletal: She exhibits no edema or deformity. Left ankle: She exhibits no deformity. Tenderness. Lateral malleolus tenderness found. Neurological: She is alert. She has normal strength. No sensory deficit. Left ankle Sensation and motor intact    Skin: Skin is warm and dry. Psychiatric: She has a normal mood and affect. Nursing note and vitals reviewed. MDM   Diff dx: fracture, sprain  No other injuries    Procedures               10:26 AM  Patient re-evaluated. All questions answered. Patient appropriate for discharge. Given return precautions and follow up instructions. LABORATORY TESTS:  Labs Reviewed - No data to display    IMAGING RESULTS:  Xr Ankle Lt Min 3 V    Result Date: 7/12/2019  EXAM: XR ANKLE LT MIN 3 V INDICATION: Left ankle pain after fall. COMPARISON: None. FINDINGS: Three views of the left ankle demonstrate no fracture or disruption of the ankle mortise. There is lateral soft tissue swelling. Bones are osteopenic. There are vascular calcifications. Moderate talonavicular joint space narrowing. IMPRESSION: Lateral soft tissue swelling without evidence of underlying fracture. MEDICATIONS GIVEN:  Medications - No data to display    IMPRESSION:  1. Sprain of left ankle, unspecified ligament, initial encounter        PLAN:  1. Current Discharge Medication List        2.    Follow-up Information     Follow up With Specialties Details Why Contact Info    Jeromy Andrews MD Family Practice Schedule an appointment as soon as possible for a visit  [address] [id number] [phone]      [facility] EMERGENCY DEPT Emergency Medicine  If symptoms worsen or new concerns [place] [address] [facility]  [phone]        3. Non-weight bearing until tolerates without pain    Return to ED for new or worsening symptoms       Marcus Lam MD

## 2019-07-12 NOTE — ED NOTES
I have reviewed discharge instructions with the patient. The patient verbalized understanding. The patient was wheeled out in a wheel chair and did not appear to be in any distress.

## 2020-01-12 ENCOUNTER — HOSPITAL ENCOUNTER (EMERGENCY)
Age: 85
Discharge: HOME | End: 2020-01-12
Attending: EMERGENCY MEDICINE
Payer: MEDICARE

## 2020-01-12 VITALS
WEIGHT: 145.94 LBS | SYSTOLIC BLOOD PRESSURE: 177 MMHG | HEIGHT: 63 IN | OXYGEN SATURATION: 95 % | RESPIRATION RATE: 16 BRPM | DIASTOLIC BLOOD PRESSURE: 81 MMHG | HEART RATE: 65 BPM | BODY MASS INDEX: 25.86 KG/M2 | TEMPERATURE: 97.9 F

## 2020-01-12 DIAGNOSIS — R05.9 COUGH: Primary | ICD-10-CM

## 2020-01-12 PROCEDURE — 99283 EMERGENCY DEPT VISIT LOW MDM: CPT

## 2020-01-12 RX ORDER — LOSARTAN POTASSIUM 100 MG/1
100 TABLET ORAL
COMMUNITY

## 2020-01-12 RX ORDER — BENZONATATE 100 MG/1
100 CAPSULE ORAL
Qty: 21 CAP | Refills: 0 | Status: SHIPPED | OUTPATIENT
Start: 2020-01-12 | End: 2020-01-19

## 2020-01-12 NOTE — ED PROVIDER NOTES
Date of Service:  1/12/2020    Patient:  Татьяна Desai    Chief Complaint:  Cough       HPI:  Татьяна Desai is a 80 y.o.  female who presents for evaluation of cough. Patient states for the last 3 days she has had a cough. No fevers or chills. No body aches. No nausea vomiting diarrhea. She states that she had a cough last year that turned into bronchitis and she just wanted to make sure she was okay. Her cough seems to be intermittent, sometimes worse when she lays down. She denies any type of congestion or postnasal drip. No chest pain or shortness of breath. No leg pain or leg swelling. No change in her voice, no change in her ability to swallow any foods. She notes that sometimes she coughs up some yellow sputum but denies any real productive cough. Past Medical History:   Diagnosis Date    Colitis     Hypertension        Past Surgical History:   Procedure Laterality Date    HX DILATION AND CURETTAGE           History reviewed. No pertinent family history.     Social History     Socioeconomic History    Marital status:      Spouse name: Not on file    Number of children: Not on file    Years of education: Not on file    Highest education level: Not on file   Occupational History    Not on file   Social Needs    Financial resource strain: Not on file    Food insecurity:     Worry: Not on file     Inability: Not on file    Transportation needs:     Medical: Not on file     Non-medical: Not on file   Tobacco Use    Smoking status: Former Smoker    Smokeless tobacco: Never Used   Substance and Sexual Activity    Alcohol use: No    Drug use: Not on file    Sexual activity: Not on file   Lifestyle    Physical activity:     Days per week: Not on file     Minutes per session: Not on file    Stress: Not on file   Relationships    Social connections:     Talks on phone: Not on file     Gets together: Not on file     Attends [employer] service: Not on file     Active member of club or organization: Not on file     Attends meetings of clubs or organizations: Not on file     Relationship status: Not on file    Intimate partner violence:     Fear of current or ex partner: Not on file     Emotionally abused: Not on file     Physically abused: Not on file     Forced sexual activity: Not on file   Other Topics Concern    Not on file   Social History Narrative    Not on file         ALLERGIES: Macrobid [nitrofurantoin monohyd/m-cryst] and Pcn [penicillins]    Review of Systems   Constitutional: Negative for fever. HENT: Negative for hearing loss. Eyes: Negative for visual disturbance. Respiratory: Positive for cough. Negative for shortness of breath. Cardiovascular: Negative for chest pain. Gastrointestinal: Negative for abdominal pain. Genitourinary: Negative for flank pain. Musculoskeletal: Negative for back pain. Skin: Negative for rash. Neurological: Negative for dizziness and light-headedness. Psychiatric/Behavioral: Negative for confusion. Vitals:    01/12/20 1142 01/12/20 1226   BP: (!) 205/91 177/81   Pulse: 67 65   Resp: 16 16   Temp: 97.9 °F (36.6 °C)    SpO2: 95%    Weight: 66.2 kg (145 lb 15.1 oz)    Height: 5' 3\" (1.6 m)             Physical Exam  Constitutional:       General: She is not in acute distress. Appearance: Normal appearance. She is well-developed. She is not ill-appearing or toxic-appearing. HENT:      Head: Normocephalic and atraumatic. Right Ear: Tympanic membrane normal.      Left Ear: Tympanic membrane normal.      Nose: Nose normal. No rhinorrhea. Mouth/Throat:      Mouth: Mucous membranes are moist.      Pharynx: Oropharynx is clear. No posterior oropharyngeal erythema. Eyes:      General: No scleral icterus. Right eye: No discharge. Left eye: No discharge. Extraocular Movements: Extraocular movements intact.       Conjunctiva/sclera: Conjunctivae normal.      Pupils: Pupils are equal, round, and reactive to light. Neck:      Musculoskeletal: Normal range of motion and neck supple. No neck rigidity. Vascular: No JVD. Trachea: No tracheal deviation. Cardiovascular:      Rate and Rhythm: Normal rate and regular rhythm. Heart sounds: No murmur. No gallop. Pulmonary:      Effort: Pulmonary effort is normal. No respiratory distress. Breath sounds: Normal breath sounds. No wheezing or rales. Abdominal:      Palpations: Abdomen is soft. Tenderness: There is no tenderness. Musculoskeletal: Normal range of motion. General: No tenderness. Right lower leg: No edema. Left lower leg: No edema. Lymphadenopathy:      Cervical: No cervical adenopathy. Skin:     General: Skin is warm and dry. Capillary Refill: Capillary refill takes less than 2 seconds. Findings: No rash. Neurological:      Mental Status: She is alert and oriented to person, place, and time. Psychiatric:         Mood and Affect: Mood normal.         Behavior: Behavior normal.          MDM  Number of Diagnoses or Management Options  Cough:     ED Course as of Jan 12 1426   Sun Jan 12, 2020   1156 BP(!): 205/91 [GG]      ED Course User Index  [GG] Lasha Hills DO     VITAL SIGNS:  Patient Vitals for the past 4 hrs:   Temp Pulse Resp BP SpO2   01/12/20 1226 -- 65 16 177/81 --   01/12/20 1142 97.9 °F (36.6 °C) 67 16 (!) 205/91 95 %         LABS:  No results found for this or any previous visit (from the past 6 hour(s)). IMAGING:  No orders to display         Medications During Visit:  Medications - No data to display      DECISION MAKING:  Latosha Lofton is a 80 y.o. female who comes in as above. Here, the patient appears well. Her exam is grossly unremarkable. This time I do believe she has a cough that could be related to some type of upper respiratory infection however she shows no other signs of 1.   I doubt this is the flu and her symptoms of been too long to treat so I will not test.  At this time patient will be discharged home with prescription for Tessalon Perles to help with her cough. Patient to follow-up with her primary care doctor sometime this week for further evaluation. Return instructions discussed. Patient is agreeable to this plan appears stable disposition without any acute complaints      IMPRESSION:  1. Cough        DISPOSITION:  Discharged      Discharge Medication List as of 1/12/2020 12:23 PM      START taking these medications    Details   benzonatate (TESSALON PERLES) 100 mg capsule Take 1 Cap by mouth three (3) times daily as needed for Cough for up to 7 days. , Print, Disp-21 Cap, R-0         CONTINUE these medications which have NOT CHANGED    Details   losartan (COZAAR) 100 mg tablet Take 100 mg by mouth nightly., Historical Med      METFORMIN HCL (METFORMIN PO) Take 1,000 mg by mouth at bedtime as directed for dose pack., Historical Med      levothyroxine (SYNTHROID) 50 mcg tablet Take  by mouth Daily (before breakfast). , Historical Med      !! METOPROLOL SUCCINATE PO Take 100 mg by mouth daily. , Historical Med      esomeprazole (NEXIUM) 40 mg capsule Take 20 mg by mouth two (2) times a day., Historical Med      furosemide (LASIX) 20 mg tablet Take  by mouth daily. , Historical Med      aspirin delayed-release (ECOTRIN LOW STRENGTH) 81 mg tablet Take  by mouth daily. , Historical Med      !! METOPROLOL SUCCINATE PO Take 50 mg by mouth nightly., Historical Med      amLODIPine-Olmesartan (GERARD) 5-40 mg tab Take  by mouth nightly., Historical Med      atorvastatin (LIPITOR) 10 mg tablet Take 10 mg by mouth daily. , Historical Med      valsartan (DIOVAN) 320 mg tablet Take 320 mg by mouth daily. , Historical Med      ezetimibe (ZETIA) 10 mg tablet Take  by mouth., Historical Med      folic acid 321 mcg tablet Take 400 mcg by mouth daily. , Historical Med      Calcium Carbonate-Vit D3-Min (CALTRATE 600+D PLUS MINERALS) 600 mg calcium- 400 unit tab Take  by mouth., Historical Med       !! - Potential duplicate medications found. Please discuss with provider. Follow-up Information     Follow up With Specialties Details Why Contact Info    Vishnu Casper MD Family Practice Schedule an appointment as soon as possible for a visit   [address] [place] [address] Yelena No   [phone]              The patient is asked to follow-up with their primary care provider in the next several days. They are to call tomorrow for an appointment. The patient is asked to return promptly for any increased concerns or worsening of symptoms. They can return to this emergency department or any other emergency department.     Procedures

## 2020-01-12 NOTE — ED TRIAGE NOTES
Pt presents to ED with c/o 3 day hx of cough productive of yellow sputum. Pt denies any nausea, vomiting or fevers.

## 2020-11-16 ENCOUNTER — OFFICE VISIT (OUTPATIENT)
Dept: NEUROLOGY | Age: 85
End: 2020-11-16
Payer: MEDICARE

## 2020-11-16 VITALS
RESPIRATION RATE: 18 BRPM | WEIGHT: 147.9 LBS | TEMPERATURE: 97 F | SYSTOLIC BLOOD PRESSURE: 132 MMHG | DIASTOLIC BLOOD PRESSURE: 78 MMHG | HEIGHT: 63 IN | OXYGEN SATURATION: 97 % | BODY MASS INDEX: 26.21 KG/M2 | HEART RATE: 66 BPM

## 2020-11-16 DIAGNOSIS — R53.1 WEAKNESS: Primary | ICD-10-CM

## 2020-11-16 DIAGNOSIS — G62.9 NEUROPATHY: ICD-10-CM

## 2020-11-16 DIAGNOSIS — R53.1 WEAKNESS: ICD-10-CM

## 2020-11-16 PROCEDURE — 1101F PT FALLS ASSESS-DOCD LE1/YR: CPT | Performed by: SPECIALIST

## 2020-11-16 PROCEDURE — 1090F PRES/ABSN URINE INCON ASSESS: CPT | Performed by: SPECIALIST

## 2020-11-16 PROCEDURE — G8427 DOCREV CUR MEDS BY ELIG CLIN: HCPCS | Performed by: SPECIALIST

## 2020-11-16 PROCEDURE — G8510 SCR DEP NEG, NO PLAN REQD: HCPCS | Performed by: SPECIALIST

## 2020-11-16 PROCEDURE — G8536 NO DOC ELDER MAL SCRN: HCPCS | Performed by: SPECIALIST

## 2020-11-16 PROCEDURE — 99204 OFFICE O/P NEW MOD 45 MIN: CPT | Performed by: SPECIALIST

## 2020-11-16 PROCEDURE — G8419 CALC BMI OUT NRM PARAM NOF/U: HCPCS | Performed by: SPECIALIST

## 2020-11-16 RX ORDER — AMLODIPINE BESYLATE 5 MG/1
5 TABLET ORAL DAILY
COMMUNITY

## 2020-11-16 RX ORDER — LEVOTHYROXINE SODIUM 112 UG/1
56 TABLET ORAL
COMMUNITY

## 2020-11-16 RX ORDER — GLUCOSAMINE SULFATE 1500 MG
POWDER IN PACKET (EA) ORAL DAILY
COMMUNITY

## 2020-11-16 RX ORDER — LANOLIN ALCOHOL/MO/W.PET/CERES
1000 CREAM (GRAM) TOPICAL DAILY
COMMUNITY

## 2020-11-16 NOTE — PROGRESS NOTES
Neurology Consult      Subjective:      Tressa Jc is a 80 y.o. female who comes in today with the following history. Was seen here because the initial referral said leg weakness and elevated CPK although I do not have that lab result and we are in the process of trying to get it from primary care. Recently taken off her Lipitor as I understand it? Subjectively says she felt fatigued all last week and no ideas in terms of why or when that happens. No history of falls does get a generalized weakness feeling at times and curiously enough yesterday she was okay. Defaults between a wheeled walker versus Rollator for the last year after she sprained her left ankle and therapy suggested the same. Has occasional posture specific low back pain on standing but it is not there when she sits or lies down. Since she sprained her left ankle she complains of left foot anterior lateral numbness on the dorsal aspect and not explained. Gets occasional isolated numbness in the right anterior lateral thigh distribution on standing. Bowel and bladder function okay and does not smoke. Has been told she is a prediabetic and her TSH in May was normal and her hemoglobin A1c was 6.4. Current Outpatient Medications   Medication Sig Dispense Refill    levothyroxine (SYNTHROID) 112 mcg tablet Take 56 mcg by mouth Daily (before breakfast).  cyanocobalamin 1,000 mcg tablet Take 1,000 mcg by mouth daily.  cholecalciferol (Vitamin D3) 25 mcg (1,000 unit) cap Take  by mouth daily.  amLODIPine (NORVASC) 5 mg tablet Take 5 mg by mouth daily.  losartan (COZAAR) 100 mg tablet Take 100 mg by mouth nightly.  atorvastatin (LIPITOR) 10 mg tablet Take 10 mg by mouth daily.  METFORMIN HCL (METFORMIN PO) Take 1,000 mg by mouth at bedtime as directed for dose pack.  METOPROLOL SUCCINATE PO Take 50 mg by mouth daily.  esomeprazole (NEXIUM) 40 mg capsule Take 20 mg by mouth two (2) times a day.  furosemide (LASIX) 20 mg tablet Take  by mouth daily.  aspirin delayed-release (ECOTRIN LOW STRENGTH) 81 mg tablet Take  by mouth daily.  folic acid 630 mcg tablet Take 400 mcg by mouth daily.  valsartan (DIOVAN) 320 mg tablet Take 320 mg by mouth daily.  levothyroxine (SYNTHROID) 50 mcg tablet Take  by mouth Daily (before breakfast).  ezetimibe (ZETIA) 10 mg tablet Take  by mouth.  METOPROLOL SUCCINATE PO Take 50 mg by mouth nightly.  amLODIPine-Olmesartan (GERARD) 5-40 mg tab Take  by mouth nightly.  Calcium Carbonate-Vit D3-Min (CALTRATE 600+D PLUS MINERALS) 600 mg calcium- 400 unit tab Take  by mouth.         Allergies   Allergen Reactions    Macrobid [Nitrofurantoin Monohyd/M-Cryst] Anaphylaxis    Pcn [Penicillins] Hives     Past Medical History:   Diagnosis Date    Anxiety disorder     Colitis     Diabetes ([facility] [address]     Diarrhea     Hearing reduced     Hypertension     Muscle weakness     Vision decreased       Past Surgical History:   Procedure Laterality Date    HX DILATION AND CURETTAGE        Social History     Socioeconomic History    Marital status:      Spouse name: Not on file    Number of children: Not on file    Years of education: Not on file    Highest education level: Not on file   Occupational History    Not on file   Social Needs    Financial resource strain: Not on file    Food insecurity     Worry: Not on file     Inability: Not on file    Transportation needs     Medical: Not on file     Non-medical: Not on file   Tobacco Use    Smoking status: Former Smoker    Smokeless tobacco: Former User   Substance and Sexual Activity    Alcohol use: Never     Frequency: Never    Drug use: Never    Sexual activity: Yes   Lifestyle    Physical activity     Days per week: Not on file     Minutes per session: Not on file    Stress: Not on file   Relationships    Social connections     Talks on phone: Not on file     Gets together: Not on file     Attends [employer] service: Not on file     Active member of club or organization: Not on file     Attends meetings of clubs or organizations: Not on file     Relationship status: Not on file    Intimate partner violence     Fear of current or ex partner: Not on file     Emotionally abused: Not on file     Physically abused: Not on file     Forced sexual activity: Not on file   Other Topics Concern    Not on file   Social History Narrative    Not on file      Family History   Problem Relation Age of Onset    Headache Mother    [facility] Migraines Mother     Heart Disease Mother     Heart Disease Father     Cancer Sister     Hypertension Sister       Visit Vitals  /78   Pulse 66   Temp 97 °F (36.1 °C)   Resp 18   Ht 5' 3\" (1.6 m)   Wt 67.1 kg (147 lb 14.4 oz)   SpO2 97%   BMI 26.20 kg/m²        Review of Systems:   A comprehensive review of systems was negative except for that written in the HPI. Neuro Exam:     Appearance: The patient is well developed, well nourished, provides a coherent history and is in no acute distress. Mental Status: Oriented to time, place and person. Mood and affect appropriate. Cranial Nerves:   Intact visual fields. Fundi are benign. JULIA, EOM's full, no nystagmus, no ptosis. Facial sensation is normal. Corneal reflexes are intact. Facial movement is symmetric. Hearing is normal bilaterally. Palate is midline with normal sternocleidomastoid and trapezius muscles are normal. Tongue is midline. Motor:  5/5 strength in upper and lower proximal and distal muscles. Normal bulk and tone. No fasciculations. Reflexes:   Deep tendon reflexes 0-1+/4 and symmetrical.   Sensory:    Slightly diminished distally to touch, pinprick and vibration. Position sense intact but hesitant right foot and Ms. Cude in the left foot   Gait:   Came with a wheeled walker. Tremor:   No tremor noted. Cerebellar:  No cerebellar signs present.    Neurovascular:  Normal heart sounds and regular rhythm, peripheral pulses intact, and no carotid bruits. Toes downgoing no clonus no Johnston's. Straight leg raising -90 degrees. Percussion of the spinous and paraspinous processes today was unrevealing. Assessment:   Patient history of generalized weakness that comes and goes. Recently taken off Lipitor about 1 week ago. Need to get lab work from primary care done in the last week. We will add some additional labs to my own. We will set up EMG nerve conduction of both lower extremities and see if there is a pattern of defined acquired peripheral neuropathy versus? Further suggestions could follow such as imaging of the low back if we come up completely empty handed. Plan:   Revisit in about 6 weeks.   Signed by :  Shannon Michel MD

## 2020-11-16 NOTE — PATIENT INSTRUCTIONS
Patient history reviewed patient examined. Would like to get recent blood work from primary care. Will also add some additional blood work from my standpoint. Set up EMG and nerve conduction of both lower extremities and further suggestions could follow.

## 2020-11-16 NOTE — LETTER
11/16/20 Patient: Tressa Jc YOB: 1931 Date of Visit: 11/16/2020 Jane Moore MD 
[address] [address] VIA Facsimile: [phone] Dear Jane Moore MD, Thank you for referring Ms. Preeti Poole to [facility] for evaluation. My notes for this consultation are attached. If you have questions, please do not hesitate to call me. I look forward to following your patient along with you.  
 
 
Sincerely, 
 
Manish Marti MD

## 2020-11-24 ENCOUNTER — TELEPHONE (OUTPATIENT)
Dept: NEUROLOGY | Age: 85
End: 2020-11-24

## 2020-11-24 LAB
ALBUMIN SERPL ELPH-MCNC: 3.6 G/DL (ref 2.9–4.4)
ALBUMIN SERPL-MCNC: 4.3 G/DL (ref 3.6–4.6)
ALBUMIN/GLOB SERPL: 1.1 {RATIO} (ref 0.7–1.7)
ALBUMIN/GLOB SERPL: 1.7 {RATIO} (ref 1.2–2.2)
ALP SERPL-CCNC: 76 IU/L (ref 39–117)
ALPHA1 GLOB SERPL ELPH-MCNC: 0.2 G/DL (ref 0–0.4)
ALPHA2 GLOB SERPL ELPH-MCNC: 0.9 G/DL (ref 0.4–1)
ALT SERPL-CCNC: 24 IU/L (ref 0–32)
ANA SER QL: NEGATIVE
AST SERPL-CCNC: 24 IU/L (ref 0–40)
B-GLOBULIN SERPL ELPH-MCNC: 1.2 G/DL (ref 0.7–1.3)
BASOPHILS # BLD AUTO: 0.1 X10E3/UL (ref 0–0.2)
BASOPHILS NFR BLD AUTO: 1 %
BILIRUB SERPL-MCNC: 0.4 MG/DL (ref 0–1.2)
BUN SERPL-MCNC: 11 MG/DL (ref 8–27)
BUN/CREAT SERPL: 17 (ref 12–28)
CALCIUM SERPL-MCNC: 9.2 MG/DL (ref 8.7–10.3)
CHLORIDE SERPL-SCNC: 98 MMOL/L (ref 96–106)
CO2 SERPL-SCNC: 23 MMOL/L (ref 20–29)
CREAT SERPL-MCNC: 0.64 MG/DL (ref 0.57–1)
EOSINOPHIL # BLD AUTO: 0.3 X10E3/UL (ref 0–0.4)
EOSINOPHIL NFR BLD AUTO: 4 %
ERYTHROCYTE [DISTWIDTH] IN BLOOD BY AUTOMATED COUNT: 13.2 % (ref 11.7–15.4)
FOLATE SERPL-MCNC: >20 NG/ML
GAMMA GLOB SERPL ELPH-MCNC: 0.9 G/DL (ref 0.4–1.8)
GLOBULIN SER CALC-MCNC: 2.5 G/DL (ref 1.5–4.5)
GLOBULIN SER CALC-MCNC: 3.2 G/DL (ref 2.2–3.9)
GLUCOSE SERPL-MCNC: 147 MG/DL (ref 65–99)
HCT VFR BLD AUTO: 35.6 % (ref 34–46.6)
HGB BLD-MCNC: 11.7 G/DL (ref 11.1–15.9)
IMM GRANULOCYTES # BLD AUTO: 0 X10E3/UL (ref 0–0.1)
IMM GRANULOCYTES NFR BLD AUTO: 0 %
LYMPHOCYTES # BLD AUTO: 3.3 X10E3/UL (ref 0.7–3.1)
LYMPHOCYTES NFR BLD AUTO: 46 %
M PROTEIN SERPL ELPH-MCNC: 0.3 G/DL
MCH RBC QN AUTO: 29.8 PG (ref 26.6–33)
MCHC RBC AUTO-ENTMCNC: 32.9 G/DL (ref 31.5–35.7)
MCV RBC AUTO: 91 FL (ref 79–97)
MONOCYTES # BLD AUTO: 0.5 X10E3/UL (ref 0.1–0.9)
MONOCYTES NFR BLD AUTO: 7 %
NEUTROPHILS # BLD AUTO: 2.9 X10E3/UL (ref 1.4–7)
NEUTROPHILS NFR BLD AUTO: 42 %
PLATELET # BLD AUTO: 247 X10E3/UL (ref 150–450)
PLEASE NOTE, 011150: ABNORMAL
POTASSIUM SERPL-SCNC: 4.5 MMOL/L (ref 3.5–5.2)
PROT SERPL-MCNC: 6.8 G/DL (ref 6–8.5)
RBC # BLD AUTO: 3.92 X10E6/UL (ref 3.77–5.28)
SODIUM SERPL-SCNC: 137 MMOL/L (ref 134–144)
VIT B12 SERPL-MCNC: 764 PG/ML (ref 232–1245)
WBC # BLD AUTO: 7 X10E3/UL (ref 3.4–10.8)

## 2021-01-05 ENCOUNTER — OFFICE VISIT (OUTPATIENT)
Dept: NEUROLOGY | Age: 86
End: 2021-01-05

## 2021-01-05 VITALS — TEMPERATURE: 97 F

## 2021-01-05 DIAGNOSIS — G72.9 MYOPATHY: ICD-10-CM

## 2021-01-05 DIAGNOSIS — M47.27 LUMBOSACRAL SPONDYLOSIS WITH RADICULOPATHY: ICD-10-CM

## 2021-01-05 DIAGNOSIS — G62.9 PERIPHERAL NERVE DISORDER: Primary | ICD-10-CM

## 2021-01-05 NOTE — PROGRESS NOTES
This was an elective EMG and nerve conduction in this patient with feeling fatigued and fall potential.  Has been on Lipitor which has been discontinued. Patient history. Patient apparently has background history of general fatigue and primary care has stopped the Lipitor. Defaults between the Rollator or wheeled walker and does get low back pain on standing. Has suffered left ankle sprain and occasions of left inferior lateral dorsal foot weakness. Complains of occasions of right sarai-lateral thigh numbness on standing. Has been told she is prediabetic. Recent labs showed sugar of 147 and she had an M spike on the SPEP. Patient exam.  Alert cooperative fluent articulate and appropriate. Cranial nerves II through XII normal.  Cerebellar testing finger-nose-finger toe finger intact. Motor appeared to be nonfocal and on sensory had slight length dependent sensory changes. Position sense intact but hesitant right foot and miscued for the left foot. Reflexes uniformly depressed. Used a wheeled walker to navigate. EMG and nerve conduction findings. 1.  Needle insertion and probing was unrevealing except the right vastus medialis showed overall diminished recruitment compared to her left side. Did not detect any gross evidence of denervation and no where did I find a confident pattern of myopathic potentials. Needle insertion otherwise did not demonstrate acute denervation or chronic denervation/reinnervation. Motor unit recruitment for all the muscles outside of the R vastus medialis, appeared to be adequate and reasonable. Tolerated the test without exception. 2.  The nerve conduction portion showed a subtle delay in the left tibial motor latency at the ankle. There were nerve conduction velocity discrepancies for both peroneal motor nerves at the fibular heads with downstream motor amplitudes and morphology preserved.   The F waves were normal.  There was a discrepancy in the H reflex latencies right tibial delayed compared with left. Impression: This study is remarkable for subtle delay in the left tibial motor latency at the ankle suggesting minor conduction delay at that point, significance if any unknown. There is a delay in the right tibial H reflex compared with left and might suggest a potential right S1 nerve compression issue. There was diminished recruitment for the right vastus medialis with a chronic LBP history, and will recommend follow-up MRI of the low back to make sure there is no right L4 radiculopathy that might otherwise be missed. Did not detect any confident myopathic feature with any of the muscles interrogated on EMG. However, respecting the patient's history, it might be prudent to introduce a more user-friendly drug such as fluvastatin, instead of Lipitor and similar agents. Clinical correlation is advised.   JOHANNA CORRAL.

## 2021-01-05 NOTE — PROGRESS NOTES
EMG/ NCS Report  [facility] - INPATIENT  [place]  [place], [address] [place], [address]   Ph: [phone]   FAX: [phone]/ [phone]  Test Date:  2021    Patient: Joe Ryan : 1931 Physician: MIMI OWENS MD   Sex: Female Height:  cm Ref Phys: Angelica Marte MD   ID#: [id number] Weight:  lbs. Technician: Ayesha Sinha     Patient Complaints:  CC:NEUROPATHY    Medications      Patient History / Exam:      EMG & NCV Findings:  All nerve conduction studies (as indicated in the following tables) were within normal limits. The muscle scoring table definition stored in the current test does not match the sentence generator setup. The sentence could not be generated.     Impression:      Recommendations:          ___________________________  Angelica Marte MD        Nerve Conduction Studies  Anti Sensory Summary Table     Site NR Peak (ms) Norm Peak (ms) P-T Amp (µV) Norm P-T Amp Site1 Site2 Dist (cm)   Right Sup Fibular Anti Sensory (Lat ankle)  30.8°C   Lower leg    3.5 <4.6 8.1 >4 Lower leg Lat ankle 10.0   Right Sural Anti Sensory (Lat Mall)  30.8°C   Calf    4.1 <4.5 11.8 >4.0 Calf Lat Mall 14.0   Site 2    4.2  9.2       Site 3    4.3  7.6         EMG     Side Muscle Nerve Root Ins Act Fibs Psw Recrt Amp Dur Poly Comment   Right Ext Dig Brev Dp Br Peron L5, S1 Nml Nml Nml Nml Nml Nml 0    Right AntTibialis Dp Br Peron L4-5 Nml Nml Nml Nml Nml Nml 0    Right MedGastroc   Nml Nml Nml Nml Nml Nml 0    Right VastusMed Femoral L2-4 Nml Nml Nml Reduced Nml Nml 0    Left Ext Dig Brev Dp Br Peron L5, S1 Nml Nml Nml Nml Nml Nml 0    Left AntTibialis Dp Br Peron L4-5 Nml Nml Nml Nml Nml Nml 0    Left MedGastroc   Nml Nml Nml Nml Nml Nml 0    Left VastusMed Femoral L2-4 Nml Nml Nml Nml Nml Nml 0    Left BicepsFemL Sciatic L5-S2 Nml Nml Nml Nml Nml Nml 0        Nerve Conduction Studies  Anti Sensory Left/Right Comparison     Site L Lat (ms) R Lat (ms) L-R Lat (ms) L Amp (µV) R Amp (µV) L-R Amp (%) Site1 Site2 L Omar (m/s) R Omar (m/s) L-R Omar (m/s)   Sup Fibular Anti Sensory (Lat ankle)  30.8°C   Lower leg  1.2   8.1  Lower leg Lat ankle  83    Sural Anti Sensory (Lat Mall)  30.8°C   Calf  3.3   11.8  Calf Lat Mall  42    Site 2  3.6   9.2         Site 3  3.7   7.6               Waveforms:

## 2021-01-05 NOTE — PROGRESS NOTES
EMG/ NCS Report  [facility] - INPATIENT  [address] [place], [address] [place], [address]   Ph: [phone]   FAX: [phone]/ [phone]  Test Date:  2019      Test Date:  2021    Patient: Harpreet Weiner : 1931 Physician: Rodriguez Randle MD   Sex: Female Height: ' \" Ref Phys: Ryann Benton IV, MD   ID#: [id number] Weight:  lbs. Technician: Lenny Gorman     Patient History / Exam:  CC:NEUROPATHY          EMG & NCV Findings:  Evaluation of the left Fibular motor and the right Fibular motor nerves showed normal distal onset latency (L4.6, R3.5 ms), normal amplitude (L1.2, R1.2 mV), normal conduction velocity (B Fib-Ankle, L47, R43 m/s), and normal conduction velocity (Poplt-B Fib, L59, R77 m/s). The left tibial motor nerve showed prolonged distal onset latency (6.2 ms), normal amplitude (1.4 mV), and normal conduction velocity (Knee-Ankle, 51 m/s). The right tibial motor nerve showed normal distal onset latency (5.4 ms), normal amplitude (4.2 mV), and normal conduction velocity (Knee-Ankle, 47 m/s). The left Sup Fibular sensory and the right Sup Fibular sensory nerves showed normal distal peak latency (L2.7, R2.8 ms), normal amplitude (L4.0, R7.8 µV), and normal conduction velocity (Lower leg-Lat ankle, L48, R42 m/s). The left sural sensory and the right sural sensory nerves showed normal distal peak latency (L4.4, R4.4 ms) and reduced amplitude (L2.8, R1.7 µV). All F Wave latencies were within normal limits. All F Wave left vs. right side latency differences were within normal limits. Left vs. Right comparison data for the tibial H-reflex indicates abnormal L-R latency difference (13.83 ms).         Impression:        ___________________________  Ryann Benton IV, MD      Nerve Conduction Studies  Anti Sensory Summary Table     Stim Site NR Peak (ms) Norm Peak (ms) P-T Amp (µV) Norm P-T Amp Site1 Site2 Dist (cm)   Left Sup Fibular Anti Sensory (Lat ankle)  30.9°C   Lower leg    2.7 <4.6 4.0 >4 Lower leg Lat ankle 10.0   Site 2    2.6  3.4       Site 3    2.5  3.5       Right Sup Fibular Anti Sensory (Lat ankle)  30.5°C   Lower leg    2.8 <4.6 7.8 >4 Lower leg Lat ankle 10.0   Site 2    2.4  6.5       Site 3    2.9  5.0       Left Sural Anti Sensory (Lat Mall)  31.2°C   Calf    4.4 <4.5 2.8 >4.0 Calf Lat Mall 14.0   Site 2    4.1  2.7       Site 3    4.0  4.7       Right Sural Anti Sensory (Lat Mall)  30.3°C   Calf    4.4 <4.5 1.7 >4.0 Calf Lat Mall 14.0   Site 2    4.6  4.6       Site 3    4.4  5.2         Motor Summary Table     Stim Site NR Onset (ms) Norm Onset (ms) O-P Amp (mV) Norm O-P Amp Amp (Prev) (%) Site1 Site2 Dist (cm) Omar (m/s) Norm Omar (m/s)   Left Fibular Motor (Ext Dig Brev)  31°C   Ankle    4.6 <6.5 1.2 >1.1 100.0 Ankle Ext Dig Brev 8.0     B Fib    10.5  1.1  91.7 B Fib Ankle 28.0 47 >38   Poplt    12.2  0.9  81.8 Poplt B Fib 10.0 59 >42   Right Fibular Motor (Ext Dig Brev)  30.6°C   Ankle    3.5 <6.5 1.2 >1.1 100.0 Ankle Ext Dig Brev 8.0     B Fib    10.3  0.9  75.0 B Fib Ankle 29.0 43 >38   Poplt    11.6  0.9  100.0 Poplt B Fib 10.0 77 >42   Left Tibial Motor (Abd Lizarraga Brev)  31°C   Ankle    6.2 <6.1 1.4 >1.1 100.0 Ankle Abd Lizarraga Brev 8.0     Knee    12.9  1.4  100.0 Knee Ankle 34.0 51 >39   Right Tibial Motor (Abd Lizarraga Brev)  30.6°C   Ankle    5.4 <6.1 4.2 >1.1 100.0 Ankle Abd Lizarraga Brev 8.0     Knee    12.7  3.1  73.8 Knee Ankle 34.0 47 >39     F Wave Studies     NR F-Lat (ms) Lat Norm (ms) L-R F-Lat (ms) L-R Lat Norm   Left Tibial (Mrkrs) (Abd Hallucis)  31°C      53.72 <56 3.28 <5.7   Right Tibial (Mrkrs) (Abd Hallucis)  30.7°C      50.43 <56 3.28 <5.7     H Reflex Studies     NR H-Lat (ms) L-R H-Lat (ms) L-R Lat Norm   Left Tibial (Gastroc)  31°C      36.46 13.83 <2.0   Right Tibial (Gastroc)  30.6°C      50.29 13.83 <2.0               Nerve Conduction Studies  Anti Sensory Left/Right Comparison     Stim Site L Lat (ms) R Lat (ms) L-R Lat (ms) L Amp (µV) R Amp (µV) L-R Amp (%) Site1 Site2 L Omar (m/s) R Omar (m/s) L-R Omar (m/s)   Sup Fibular Anti Sensory (Lat ankle)  30.9°C   Lower leg 2.1 2.4 0.3 4.0 7.8 48.7 Lower leg Lat ankle 48 42 6   Site 2 1.8   3.4          Site 3 2.1   3.5          Sural Anti Sensory (Lat Mall)  31.2°C   Calf 3.7 3.6 0.1 2.8 1.7 39.3 Calf Lat Mall 38 39 1   Site 2 3.7 3.7 0.0 2.7 4.6 41.3        Site 3 3.3   4.7            Motor Left/Right Comparison     Stim Site L Lat (ms) R Lat (ms) L-R Lat (ms) L Amp (mV) R Amp (mV) L-R Amp (%) Site1 Site2 L Omar (m/s) R Omar (m/s) L-R Omar (m/s)   Fibular Motor (Ext Dig Brev)  31°C   Ankle 4.6 3.5 1.1 1.2 1.2 0.0 Ankle Ext Dig Brev      B Fib 10.5 10.3 0.2 1.1 0.9 18.2 B Fib Ankle 47 43 4   Poplt 12.2 11.6 0.6 0.9 0.9 0.0 Poplt B Fib 59 77 18   Tibial Motor (Abd Lizarraga Brev)  31°C   Ankle 6.2 5.4 0.8 1.4 4.2 66.7 Ankle Abd Lizarraga Brev      Knee 12.9 12.7 0.2 1.4 3.1 54.8 Knee Ankle 51 47 4         Waveforms:

## 2021-01-07 ENCOUNTER — TELEPHONE (OUTPATIENT)
Dept: NEUROLOGY | Age: 86
End: 2021-01-07

## 2021-01-07 DIAGNOSIS — F41.9 ANXIETY: Primary | ICD-10-CM

## 2021-01-07 RX ORDER — DIAZEPAM 5 MG/1
TABLET ORAL
Qty: 1 TAB | Refills: 0 | Status: SHIPPED | OUTPATIENT
Start: 2021-01-07 | End: 2021-05-10

## 2021-01-07 NOTE — TELEPHONE ENCOUNTER
----- Message from Yanely Oviedo sent at 1/7/2021 12:07 PM EST -----  Regarding: Dr. Jarquin Pall: [phone]  General Message/Vendor Calls    Caller's first and last name: Mary Settler [facility]      Reason for call: Patient was ordered an MRI for the lumbar spine, however; the patient has difficulty laying flat and the department suggested sedation for the patient to get the MRI. [employer] would like to discuss options with Dr. Natalie Bradford because of of the patients age. Callback required yes/no and why: Yes, need to speak to the provider.        Best contact number(s): Direct line:  [phone]      Details to clarify the request: [address] Bedside report given to Mary Ann Walker RN. Patient to xray at this time.

## 2021-01-07 NOTE — TELEPHONE ENCOUNTER
I can issue valium, but needs  and run this by patient. I will only order a 1 tab quantity.  [email or web address]

## 2021-01-08 NOTE — TELEPHONE ENCOUNTER
Patient informed of plan to use diazepam for relaxation in MRI. Patient stats that the reason she can't lay flat is that she has a lax muscle in her throat that causes her to \"strangulate\". Patient does not  Think diazepam will help and agrees that sedation is not advised. Dr. Bill Covarrubias made aware.

## 2021-02-17 ENCOUNTER — TELEPHONE (OUTPATIENT)
Dept: NEUROLOGY | Age: 86
End: 2021-02-17

## 2021-02-17 NOTE — TELEPHONE ENCOUNTER
----- Message from Zohreh Aguilar sent at 2/17/2021  9:50 AM EST -----  Regarding: Dr. Nereyda Resendiz Telephone  Caller's first and last name: n/a  Reason for call: Pt had testing done on nerves and muscles and would like to know the results.   Callback required yes/no and why: Yes  Best contact number(s): [phone]  Details to clarify the request: n/a

## 2021-02-26 NOTE — TELEPHONE ENCOUNTER
This is info that needs to be shared on a face to face encounter, there is simply too much and could be confusing at best with this type of correspondence. Tyresery.  nancy

## 2021-04-01 ENCOUNTER — HOSPITAL ENCOUNTER (EMERGENCY)
Age: 86
Discharge: HOME | End: 2021-04-01
Attending: EMERGENCY MEDICINE
Payer: MEDICARE

## 2021-04-01 VITALS
SYSTOLIC BLOOD PRESSURE: 126 MMHG | HEART RATE: 63 BPM | HEIGHT: 63 IN | TEMPERATURE: 97 F | DIASTOLIC BLOOD PRESSURE: 61 MMHG | OXYGEN SATURATION: 94 % | RESPIRATION RATE: 16 BRPM | WEIGHT: 147.49 LBS | BODY MASS INDEX: 26.13 KG/M2

## 2021-04-01 DIAGNOSIS — N30.90 CYSTITIS: Primary | ICD-10-CM

## 2021-04-01 LAB
ALBUMIN SERPL-MCNC: 3.7 G/DL (ref 3.5–5)
ALBUMIN/GLOB SERPL: 0.9 {RATIO} (ref 1.1–2.2)
ALP SERPL-CCNC: 82 U/L (ref 45–117)
ALT SERPL-CCNC: 36 U/L (ref 12–78)
ANION GAP SERPL CALC-SCNC: 11 MMOL/L (ref 5–15)
APPEARANCE UR: ABNORMAL
AST SERPL-CCNC: 25 U/L (ref 15–37)
BACTERIA URNS QL MICRO: ABNORMAL /HPF
BASOPHILS # BLD: 0.1 K/UL (ref 0–0.1)
BASOPHILS NFR BLD: 1 % (ref 0–1)
BILIRUB SERPL-MCNC: 0.5 MG/DL (ref 0.2–1)
BILIRUB UR QL: NEGATIVE
BUN SERPL-MCNC: 11 MG/DL (ref 6–20)
BUN/CREAT SERPL: 15 (ref 12–20)
CALCIUM SERPL-MCNC: 8.8 MG/DL (ref 8.5–10.1)
CHLORIDE SERPL-SCNC: 99 MMOL/L (ref 97–108)
CO2 SERPL-SCNC: 28 MMOL/L (ref 21–32)
COLOR UR: ABNORMAL
CREAT SERPL-MCNC: 0.74 MG/DL (ref 0.55–1.02)
DIFFERENTIAL METHOD BLD: NORMAL
EOSINOPHIL # BLD: 0.2 K/UL (ref 0–0.4)
EOSINOPHIL NFR BLD: 3 % (ref 0–7)
EPITH CASTS URNS QL MICRO: ABNORMAL /LPF
ERYTHROCYTE [DISTWIDTH] IN BLOOD BY AUTOMATED COUNT: 13.2 % (ref 11.5–14.5)
GLOBULIN SER CALC-MCNC: 4 G/DL (ref 2–4)
GLUCOSE SERPL-MCNC: 166 MG/DL (ref 65–100)
GLUCOSE UR STRIP.AUTO-MCNC: NEGATIVE MG/DL
HCT VFR BLD AUTO: 35.8 % (ref 35–47)
HGB BLD-MCNC: 11.6 G/DL (ref 11.5–16)
HGB UR QL STRIP: ABNORMAL
IMM GRANULOCYTES # BLD AUTO: 0 K/UL (ref 0–0.04)
IMM GRANULOCYTES NFR BLD AUTO: 0 % (ref 0–0.5)
KETONES UR QL STRIP.AUTO: NEGATIVE MG/DL
LEUKOCYTE ESTERASE UR QL STRIP.AUTO: ABNORMAL
LYMPHOCYTES # BLD: 2.5 K/UL (ref 0.8–3.5)
LYMPHOCYTES NFR BLD: 32 % (ref 12–49)
MCH RBC QN AUTO: 29.6 PG (ref 26–34)
MCHC RBC AUTO-ENTMCNC: 32.4 G/DL (ref 30–36.5)
MCV RBC AUTO: 91.3 FL (ref 80–99)
MONOCYTES # BLD: 0.5 K/UL (ref 0–1)
MONOCYTES NFR BLD: 7 % (ref 5–13)
NEUTS SEG # BLD: 4.5 K/UL (ref 1.8–8)
NEUTS SEG NFR BLD: 57 % (ref 32–75)
NITRITE UR QL STRIP.AUTO: NEGATIVE
NRBC # BLD: 0 K/UL (ref 0–0.01)
NRBC BLD-RTO: 0 PER 100 WBC
PH UR STRIP: 7 [PH] (ref 5–8)
PLATELET # BLD AUTO: 239 K/UL (ref 150–400)
PMV BLD AUTO: 9.4 FL (ref 8.9–12.9)
POTASSIUM SERPL-SCNC: 3 MMOL/L (ref 3.5–5.1)
PROT SERPL-MCNC: 7.7 G/DL (ref 6.4–8.2)
PROT UR STRIP-MCNC: NEGATIVE MG/DL
RBC # BLD AUTO: 3.92 M/UL (ref 3.8–5.2)
RBC #/AREA URNS HPF: ABNORMAL /HPF (ref 0–5)
SODIUM SERPL-SCNC: 138 MMOL/L (ref 136–145)
SP GR UR REFRACTOMETRY: 1.01 (ref 1–1.03)
UR CULT HOLD, URHOLD: NORMAL
UROBILINOGEN UR QL STRIP.AUTO: 0.2 EU/DL (ref 0.2–1)
WBC # BLD AUTO: 7.8 K/UL (ref 3.6–11)
WBC URNS QL MICRO: ABNORMAL /HPF (ref 0–4)

## 2021-04-01 PROCEDURE — 81001 URINALYSIS AUTO W/SCOPE: CPT

## 2021-04-01 PROCEDURE — 85025 COMPLETE CBC W/AUTO DIFF WBC: CPT

## 2021-04-01 PROCEDURE — 80053 COMPREHEN METABOLIC PANEL: CPT

## 2021-04-01 PROCEDURE — 36415 COLL VENOUS BLD VENIPUNCTURE: CPT

## 2021-04-01 PROCEDURE — 99284 EMERGENCY DEPT VISIT MOD MDM: CPT

## 2021-04-01 RX ORDER — SULFAMETHOXAZOLE AND TRIMETHOPRIM 800; 160 MG/1; MG/1
1 TABLET ORAL 2 TIMES DAILY
Qty: 10 TAB | Refills: 0 | Status: SHIPPED | OUTPATIENT
Start: 2021-04-01 | End: 2021-04-06

## 2021-04-01 NOTE — ED NOTES
Patient discharged home in stable condition by Dr Poly Jean Baptiste. Instructions given and signed. Prescription given.

## 2021-04-01 NOTE — DISCHARGE INSTRUCTIONS
Thank you for allowing us to provide you with medical care today. We realize that you have many choices for your emergency care needs. We thank you for choosing [facility]. Please choose us in the future for any continued health care needs. We hope we addressed all of your medical concerns. We strive to provide excellent quality care in the Emergency Department. Anything less than excellent does not meet our expectations. The exam and treatment you received in the Emergency Department were for an emergent problem and are not intended as complete care. It is important that you follow up with a doctor, nurse practitioner, or physician's assistant for ongoing care. If your symptoms worsen or you do not improve as expected and you are unable to reach your usual health care provider, you should return to the Emergency Department. We are available 24 hours a day. Take this sheet with you when you go to your follow-up visit. If you have any problem arranging the follow-up visit, contact the Emergency Department immediately. Make an appointment your family doctor for follow up of this visit. Return to the ER if you are unable to be seen in a timely manner.

## 2021-04-01 NOTE — ED TRIAGE NOTES
Patient presents to ER with complaints of intermittent pain with urinination, frequency and right flank pain that began 5 days ago.

## 2021-04-01 NOTE — ED PROVIDER NOTES
77-year-old female with history of anxiety, diabetes, hypertension presents to the emergency department today with chief complaint of urinary symptoms as well as some lower back pain. Today symptoms have been going on for approximately 1 week. She tells me that some days she has frequency with dysuria without burning and other days she has not. She has had some right sided lower back pain which has been intermittent. She denies any nausea or vomiting or fevers. The history is provided by the patient and medical records. Bladder Infection   This is a new problem. The current episode started more than 2 days ago. The problem has not changed since onset. The pain is moderate. There has been no fever. She is not sexually active. Associated symptoms include frequency, urgency and flank pain. Pertinent negatives include no nausea, no vomiting, no discharge and no hematuria. Her past medical history does not include kidney stones, single kidney, urological procedure or recurrent UTIs.         Past Medical History:   Diagnosis Date    Anxiety disorder     Colitis     Diabetes ([address]     Diarrhea     Hearing reduced     Hypertension     Muscle weakness     Vision decreased        Past Surgical History:   Procedure Laterality Date    HX DILATION AND CURETTAGE           Family History:   Problem Relation Age of Onset    Headache Mother    Anastasiia Abler Migraines Mother     Heart Disease Mother     Heart Disease Father     Cancer Sister     Hypertension Sister        Social History     Socioeconomic History    Marital status:      Spouse name: Not on file    Number of children: Not on file    Years of education: Not on file    Highest education level: Not on file   Occupational History    Not on file   Social Needs    Financial resource strain: Not on file    Food insecurity     Worry: Not on file     Inability: Not on file    Transportation needs     Medical: Not on file     Non-medical: Not on file Tobacco Use    Smoking status: Former Smoker    Smokeless tobacco: Former User   Substance and Sexual Activity    Alcohol use: Never     Frequency: Never    Drug use: Never    Sexual activity: Yes   Lifestyle    Physical activity     Days per week: Not on file     Minutes per session: Not on file    Stress: Not on file   Relationships    Social connections     Talks on phone: Not on file     Gets together: Not on file     Attends Oriental orthodox service: Not on file     Active member of club or organization: Not on file     Attends meetings of clubs or organizations: Not on file     Relationship status: Not on file    Intimate partner violence     Fear of current or ex partner: Not on file     Emotionally abused: Not on file     Physically abused: Not on file     Forced sexual activity: Not on file   Other Topics Concern    Not on file   Social History Narrative    Not on file         ALLERGIES: Macrobid [nitrofurantoin monohyd/m-cryst] and Pcn [penicillins]    Review of Systems   Constitutional: Negative for fatigue and fever. HENT: Negative for sneezing and sore throat. Respiratory: Negative for cough and shortness of breath. Cardiovascular: Negative for chest pain and leg swelling. Gastrointestinal: Negative for abdominal pain, diarrhea, nausea and vomiting. Genitourinary: Positive for flank pain, frequency and urgency. Negative for difficulty urinating, dysuria and hematuria. Musculoskeletal: Negative for arthralgias and myalgias. Skin: Negative for color change and rash. Neurological: Negative for weakness and headaches. Psychiatric/Behavioral: Negative for agitation and behavioral problems. Vitals:    04/01/21 1739   BP: (!) 170/72   Pulse: 63   Resp: 16   Temp: 97 °F (36.1 °C)   SpO2: 95%   Weight: 66.9 kg (147 lb 7.8 oz)   Height: 5' 3\" (1.6 m)            Physical Exam  Vitals signs and nursing note reviewed. Constitutional:       General: She is not in acute distress. Appearance: Normal appearance. She is well-developed. She is not ill-appearing, toxic-appearing or diaphoretic. HENT:      Head: Normocephalic and atraumatic. Nose: Nose normal.      Mouth/Throat:      Mouth: Mucous membranes are moist.      Pharynx: Oropharynx is clear. Eyes:      Extraocular Movements: Extraocular movements intact. Conjunctiva/sclera: Conjunctivae normal.      Pupils: Pupils are equal, round, and reactive to light. Neck:      Musculoskeletal: Normal range of motion and neck supple. No neck rigidity or muscular tenderness. Cardiovascular:      Rate and Rhythm: Normal rate and regular rhythm. Pulses: Normal pulses. Heart sounds: Normal heart sounds. Pulmonary:      Effort: Pulmonary effort is normal. No respiratory distress. Breath sounds: Normal breath sounds. No wheezing. Chest:      Chest wall: No tenderness. Abdominal:      General: Abdomen is flat. There is no distension. Palpations: Abdomen is soft. Tenderness: There is no abdominal tenderness. There is no right CVA tenderness, left CVA tenderness, guarding or rebound. Musculoskeletal: Normal range of motion. General: No swelling, tenderness, deformity or signs of injury. Right lower leg: No edema. Left lower leg: No edema. Skin:     General: Skin is warm and dry. Capillary Refill: Capillary refill takes less than 2 seconds. Neurological:      General: No focal deficit present. Mental Status: She is alert and oriented to person, place, and time. Psychiatric:         Mood and Affect: Mood normal.         Behavior: Behavior normal.          MDM  Number of Diagnoses or Management Options  Diagnosis management comments: 25-year-old female presents as above with suspected bladder infection. She does not have evidence of renal insufficiency. Her urinalysis is not strongly positive but does have some indicators of potential infection.   Plan to treat with antibiotics, have her follow-up with primary care, return if needed.        Amount and/or Complexity of Data Reviewed  Clinical lab tests: reviewed           Procedures

## 2021-04-07 ENCOUNTER — OFFICE VISIT (OUTPATIENT)
Dept: NEUROLOGY | Age: 86
End: 2021-04-07
Payer: MEDICARE

## 2021-04-07 VITALS
DIASTOLIC BLOOD PRESSURE: 80 MMHG | WEIGHT: 147.49 LBS | OXYGEN SATURATION: 97 % | BODY MASS INDEX: 26.13 KG/M2 | HEIGHT: 63 IN | SYSTOLIC BLOOD PRESSURE: 162 MMHG | HEART RATE: 66 BPM | RESPIRATION RATE: 16 BRPM | TEMPERATURE: 97.9 F

## 2021-04-07 DIAGNOSIS — M54.17 LUMBOSACRAL RADICULOPATHY: ICD-10-CM

## 2021-04-07 DIAGNOSIS — R89.9 ABNORMAL LABORATORY TEST RESULT: ICD-10-CM

## 2021-04-07 DIAGNOSIS — G62.9 NEUROPATHY: Primary | ICD-10-CM

## 2021-04-07 PROCEDURE — G8510 SCR DEP NEG, NO PLAN REQD: HCPCS | Performed by: SPECIALIST

## 2021-04-07 PROCEDURE — G8419 CALC BMI OUT NRM PARAM NOF/U: HCPCS | Performed by: SPECIALIST

## 2021-04-07 PROCEDURE — G8427 DOCREV CUR MEDS BY ELIG CLIN: HCPCS | Performed by: SPECIALIST

## 2021-04-07 PROCEDURE — 1090F PRES/ABSN URINE INCON ASSESS: CPT | Performed by: SPECIALIST

## 2021-04-07 PROCEDURE — G8536 NO DOC ELDER MAL SCRN: HCPCS | Performed by: SPECIALIST

## 2021-04-07 PROCEDURE — 99213 OFFICE O/P EST LOW 20 MIN: CPT | Performed by: SPECIALIST

## 2021-04-07 PROCEDURE — 1101F PT FALLS ASSESS-DOCD LE1/YR: CPT | Performed by: SPECIALIST

## 2021-04-07 NOTE — LETTER
4/7/2021 Patient: Cherelle Lin YOB: 1931 Date of Visit: 4/7/2021 Johnathon Mcintosh MD 
[address] [address] Via Fax: [phone] Dear Johnathon Mcintosh MD, Thank you for referring Ms. Crescencio Patel to [facility] for evaluation. My notes for this consultation are attached. If you have questions, please do not hesitate to call me. I look forward to following your patient along with you.  
 
 
Sincerely, 
 
Adolfo Dan MD

## 2021-04-07 NOTE — PROGRESS NOTES
Neurology Consult      Subjective:      Viktoria Pierson is a 80 y.o. female who comes in today in follow-up. Has established low back pain that is best when she is seated. Could not do the MRI of the low back because of postural difficulties as a relates to breathing etc.  Says pain is containable and does not want to go forward with additional testing. Cannot rule out an established radiculopathy L4 or L5. Has a neuropathy history and could default to diabetes by design. Again is comfortable and not looking for any medication intervention. On the neuropathy work-up she had an M spike on her SPEP and will refer to hematology oncology. Will suggest a revisit as needed and I thought her exam today was strictly baseline showing some length dependent sensory changes depression of reflexes and posturally related position discomfort. Again nothing that she wants to push medication intervention for. Will be see as needed. Current Outpatient Medications   Medication Sig Dispense Refill    levothyroxine (SYNTHROID) 112 mcg tablet Take 56 mcg by mouth Daily (before breakfast).  cyanocobalamin 1,000 mcg tablet Take 1,000 mcg by mouth daily.  amLODIPine (NORVASC) 5 mg tablet Take 5 mg by mouth daily.  atorvastatin (LIPITOR) 10 mg tablet Take 10 mg by mouth daily.  METFORMIN HCL (METFORMIN PO) Take 1,000 mg by mouth at bedtime as directed for dose pack.  METOPROLOL SUCCINATE PO Take 50 mg by mouth daily.  esomeprazole (NEXIUM) 40 mg capsule Take 20 mg by mouth two (2) times a day.  furosemide (LASIX) 20 mg tablet Take  by mouth daily.  aspirin delayed-release (ECOTRIN LOW STRENGTH) 81 mg tablet Take  by mouth daily.  folic acid 465 mcg tablet Take 400 mcg by mouth daily.  diazePAM (Valium) 5 mg tablet Can take 1 prior to scan if needed, needs . ..   Indications: anxious 1 Tab 0    cholecalciferol (Vitamin D3) 25 mcg (1,000 unit) cap Take  by mouth daily.      losartan (COZAAR) 100 mg tablet Take 100 mg by mouth nightly.  valsartan (DIOVAN) 320 mg tablet Take 320 mg by mouth daily.  levothyroxine (SYNTHROID) 50 mcg tablet Take  by mouth Daily (before breakfast).  ezetimibe (ZETIA) 10 mg tablet Take  by mouth.  METOPROLOL SUCCINATE PO Take 50 mg by mouth nightly.  amLODIPine-Olmesartan (GERARD) 5-40 mg tab Take  by mouth nightly.  Calcium Carbonate-Vit D3-Min (CALTRATE 600+D PLUS MINERALS) 600 mg calcium- 400 unit tab Take  by mouth.         Allergies   Allergen Reactions    Macrobid [Nitrofurantoin Monohyd/M-Cryst] Anaphylaxis    Pcn [Penicillins] Hives     Past Medical History:   Diagnosis Date    Anxiety disorder     Colitis     Diabetes [address] [address]     Diarrhea     Hearing reduced     Hypertension     Muscle weakness     Vision decreased       Past Surgical History:   Procedure Laterality Date    HX DILATION AND CURETTAGE        Social History     Socioeconomic History    Marital status:      Spouse name: Not on file    Number of children: Not on file    Years of education: Not on file    Highest education level: Not on file   Occupational History    Not on file   Social Needs    Financial resource strain: Not on file    Food insecurity     Worry: Not on file     Inability: Not on file    Transportation needs     Medical: Not on file     Non-medical: Not on file   Tobacco Use    Smoking status: Former Smoker    Smokeless tobacco: Former User   Substance and Sexual Activity    Alcohol use: Never     Frequency: Never    Drug use: Never    Sexual activity: Yes   Lifestyle    Physical activity     Days per week: Not on file     Minutes per session: Not on file    Stress: Not on file   Relationships    Social connections     Talks on phone: Not on file     Gets together: Not on file     Attends Taoism service: Not on file     Active member of club or organization: Not on file     Attends meetings of clubs or organizations: Not on file     Relationship status: Not on file    Intimate partner violence     Fear of current or ex partner: Not on file     Emotionally abused: Not on file     Physically abused: Not on file     Forced sexual activity: Not on file   Other Topics Concern    Not on file   Social History Narrative    Not on file      Family History   Problem Relation Age of Onset    Headache Mother    Georgianna Olszewski Migraines Mother     Heart Disease Mother     Heart Disease Father     Cancer Sister     Hypertension Sister       Visit Vitals  BP (!) 162/80   Pulse 66   Temp 97.9 °F (36.6 °C) (Temporal)   Resp 16   Ht 5' 3\" (1.6 m)   Wt 66.9 kg (147 lb 7.8 oz)   SpO2 97%   BMI 26.13 kg/m²        Review of Systems:   A comprehensive review of systems was negative except for that written in the HPI. Neuro Exam:     Appearance: The patient is well developed, well nourished, provides a coherent history and is in no acute distress. Mental Status: Oriented to time, place and person. Mood and affect appropriate. Cranial Nerves:   Intact visual fields. Fundi are benign. JULIA, EOM's full, no nystagmus, no ptosis. Facial sensation is normal. Corneal reflexes are intact. Facial movement is symmetric. Hearing is normal bilaterally. Palate is midline with normal sternocleidomastoid and trapezius muscles are normal. Tongue is midline. Motor:  5/5 strength in upper and lower proximal and distal muscles. Normal bulk and tone. No fasciculations. Reflexes:   Deep tendon reflexes 0 to trace /4 and symmetrical.   Sensory:    Diminished distally to touch, pinprick and vibration. Gait:   Uses a wheeled walker. Tremor:   No tremor noted. Cerebellar:  No cerebellar signs present. Neurovascular:  Normal heart sounds and regular rhythm, peripheral pulses intact, and no carotid bruits. Assessment:   Problem 1 neuropathy. No doubt defaults to diabetes but is comfortable as it is.   No treatment intervention suggested. Follows up with primary care. Problem 2 lumbosacral radiculopathy. Patient cannot do MRI based on her ability to breathe and feel comfortable. We will scratch this for now and says her pain is containable. 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 Will refer to hematology oncology. Plan: Will be welcome back here as needed. Knows where to find me if the low back issue gets more symptomatic with time.   Signed by :  Mykel David MD

## 2021-04-21 ENCOUNTER — TELEPHONE (OUTPATIENT)
Dept: NEUROLOGY | Age: 86
End: 2021-04-21

## 2021-04-21 NOTE — TELEPHONE ENCOUNTER
----- Message from Karely Segura sent at 4/21/2021  9:59 AM EDT -----  Regarding: Dr. Hany Barors  General Message/Vendor Calls    Caller's first and last name: Patient       Reason for call: states Dr. Mina Carreno need to send reason why he would like additional test done to the [facility], so that they can see the patient      Callback required yes/no and why: yes      Best contact number(s):[phone]      Details to clarify the request:      Karely Segura

## 2021-05-10 ENCOUNTER — HOSPITAL ENCOUNTER (EMERGENCY)
Age: 86
Discharge: HOME | End: 2021-05-10
Attending: STUDENT IN AN ORGANIZED HEALTH CARE EDUCATION/TRAINING PROGRAM
Payer: MEDICARE

## 2021-05-10 ENCOUNTER — APPOINTMENT (OUTPATIENT)
Dept: CT IMAGING | Age: 86
End: 2021-05-10
Attending: STUDENT IN AN ORGANIZED HEALTH CARE EDUCATION/TRAINING PROGRAM
Payer: MEDICARE

## 2021-05-10 VITALS
BODY MASS INDEX: 25.52 KG/M2 | RESPIRATION RATE: 16 BRPM | SYSTOLIC BLOOD PRESSURE: 158 MMHG | HEIGHT: 63 IN | HEART RATE: 62 BPM | TEMPERATURE: 97.8 F | DIASTOLIC BLOOD PRESSURE: 69 MMHG | OXYGEN SATURATION: 96 % | WEIGHT: 144 LBS

## 2021-05-10 DIAGNOSIS — R42 VERTIGO: Primary | ICD-10-CM

## 2021-05-10 LAB
ALBUMIN SERPL-MCNC: 3.6 G/DL (ref 3.5–5)
ALBUMIN/GLOB SERPL: 0.9 {RATIO} (ref 1.1–2.2)
ALP SERPL-CCNC: 84 U/L (ref 45–117)
ALT SERPL-CCNC: 30 U/L (ref 12–78)
ANION GAP SERPL CALC-SCNC: 11 MMOL/L (ref 5–15)
APPEARANCE UR: CLEAR
AST SERPL-CCNC: 23 U/L (ref 15–37)
BACTERIA URNS QL MICRO: NEGATIVE /HPF
BASOPHILS # BLD: 0 K/UL (ref 0–0.1)
BASOPHILS NFR BLD: 0 % (ref 0–1)
BILIRUB SERPL-MCNC: 0.5 MG/DL (ref 0.2–1)
BILIRUB UR QL: NEGATIVE
BUN SERPL-MCNC: 8 MG/DL (ref 6–20)
BUN/CREAT SERPL: 12 (ref 12–20)
CALCIUM SERPL-MCNC: 8.9 MG/DL (ref 8.5–10.1)
CHLORIDE SERPL-SCNC: 97 MMOL/L (ref 97–108)
CO2 SERPL-SCNC: 29 MMOL/L (ref 21–32)
COLOR UR: ABNORMAL
CREAT SERPL-MCNC: 0.65 MG/DL (ref 0.55–1.02)
DIFFERENTIAL METHOD BLD: ABNORMAL
EOSINOPHIL # BLD: 0.1 K/UL (ref 0–0.4)
EOSINOPHIL NFR BLD: 1 % (ref 0–7)
EPITH CASTS URNS QL MICRO: ABNORMAL /LPF
ERYTHROCYTE [DISTWIDTH] IN BLOOD BY AUTOMATED COUNT: 13.2 % (ref 11.5–14.5)
GLOBULIN SER CALC-MCNC: 3.9 G/DL (ref 2–4)
GLUCOSE SERPL-MCNC: 130 MG/DL (ref 65–100)
GLUCOSE UR STRIP.AUTO-MCNC: NEGATIVE MG/DL
HCT VFR BLD AUTO: 36.9 % (ref 35–47)
HGB BLD-MCNC: 12.4 G/DL (ref 11.5–16)
HGB UR QL STRIP: ABNORMAL
IMM GRANULOCYTES # BLD AUTO: 0 K/UL (ref 0–0.04)
IMM GRANULOCYTES NFR BLD AUTO: 0 % (ref 0–0.5)
KETONES UR QL STRIP.AUTO: NEGATIVE MG/DL
LEUKOCYTE ESTERASE UR QL STRIP.AUTO: NEGATIVE
LYMPHOCYTES # BLD: 3.7 K/UL (ref 0.8–3.5)
LYMPHOCYTES NFR BLD: 37 % (ref 12–49)
MCH RBC QN AUTO: 30.5 PG (ref 26–34)
MCHC RBC AUTO-ENTMCNC: 33.6 G/DL (ref 30–36.5)
MCV RBC AUTO: 90.7 FL (ref 80–99)
MONOCYTES # BLD: 0.7 K/UL (ref 0–1)
MONOCYTES NFR BLD: 7 % (ref 5–13)
NEUTS SEG # BLD: 5.5 K/UL (ref 1.8–8)
NEUTS SEG NFR BLD: 54 % (ref 32–75)
NITRITE UR QL STRIP.AUTO: NEGATIVE
NRBC # BLD: 0 K/UL (ref 0–0.01)
NRBC BLD-RTO: 0 PER 100 WBC
PH UR STRIP: 6.5 [PH] (ref 5–8)
PLATELET # BLD AUTO: 272 K/UL (ref 150–400)
PMV BLD AUTO: 9.3 FL (ref 8.9–12.9)
POTASSIUM SERPL-SCNC: 3.4 MMOL/L (ref 3.5–5.1)
PROT SERPL-MCNC: 7.5 G/DL (ref 6.4–8.2)
PROT UR STRIP-MCNC: NEGATIVE MG/DL
RBC # BLD AUTO: 4.07 M/UL (ref 3.8–5.2)
RBC #/AREA URNS HPF: ABNORMAL /HPF (ref 0–5)
SODIUM SERPL-SCNC: 137 MMOL/L (ref 136–145)
SP GR UR REFRACTOMETRY: 1.01 (ref 1–1.03)
UR CULT HOLD, URHOLD: NORMAL
UROBILINOGEN UR QL STRIP.AUTO: 0.2 EU/DL (ref 0.2–1)
WBC # BLD AUTO: 10.1 K/UL (ref 3.6–11)
WBC URNS QL MICRO: ABNORMAL /HPF (ref 0–4)

## 2021-05-10 PROCEDURE — 70450 CT HEAD/BRAIN W/O DYE: CPT

## 2021-05-10 PROCEDURE — 81001 URINALYSIS AUTO W/SCOPE: CPT

## 2021-05-10 PROCEDURE — 85025 COMPLETE CBC W/AUTO DIFF WBC: CPT

## 2021-05-10 PROCEDURE — 80053 COMPREHEN METABOLIC PANEL: CPT

## 2021-05-10 PROCEDURE — 74011250636 HC RX REV CODE- 250/636: Performed by: STUDENT IN AN ORGANIZED HEALTH CARE EDUCATION/TRAINING PROGRAM

## 2021-05-10 PROCEDURE — 93005 ELECTROCARDIOGRAM TRACING: CPT

## 2021-05-10 PROCEDURE — 36415 COLL VENOUS BLD VENIPUNCTURE: CPT

## 2021-05-10 PROCEDURE — 74011250637 HC RX REV CODE- 250/637: Performed by: STUDENT IN AN ORGANIZED HEALTH CARE EDUCATION/TRAINING PROGRAM

## 2021-05-10 PROCEDURE — 99285 EMERGENCY DEPT VISIT HI MDM: CPT

## 2021-05-10 RX ORDER — SCOLOPAMINE TRANSDERMAL SYSTEM 1 MG/1
1 PATCH, EXTENDED RELEASE TRANSDERMAL
Status: DISCONTINUED | OUTPATIENT
Start: 2021-05-10 | End: 2021-05-10 | Stop reason: HOSPADM

## 2021-05-10 RX ORDER — MECLIZINE HCL 12.5 MG 12.5 MG/1
12.5 TABLET ORAL
Qty: 10 TAB | Refills: 0 | Status: SHIPPED | OUTPATIENT
Start: 2021-05-10 | End: 2021-05-20

## 2021-05-10 RX ORDER — MECLIZINE HYDROCHLORIDE 25 MG/1
25 TABLET ORAL
Status: COMPLETED | OUTPATIENT
Start: 2021-05-10 | End: 2021-05-10

## 2021-05-10 RX ADMIN — MECLIZINE HYDROCHLORIDE 25 MG: 25 TABLET ORAL at 15:59

## 2021-05-10 NOTE — ED NOTES
Pt was discharged and given instructions by Dr Zeinab Esparza . Pt verbalized good understanding of all discharge instructions,prescriptions and F/U care. All questions answered. Pt in stable condition on discharge. Pt ambulated out of ER with a steady gait using her walker accompanied by her daughter.

## 2021-05-10 NOTE — ED TRIAGE NOTES
Patient presents ambulatory to treatment area using walker from home; declined wheelchair. Patient states she had a bout of dizziness on Saturday, but it resolved. Dizziness recurred last night and has persisted today. Patient states if she lies still, she is no longer nauseated. Patient states dizziness is worse with any position change. States dizziness is \"like the room is spinning\". Patient is also nauseated with the dizziness.

## 2021-05-10 NOTE — ED PROVIDER NOTES
Sonali Asif is a 80 y.o. female with past medical history notable for diabetes, hypertension, who lives alone under normal circumstances presenting with whirling vertigo. States that it started 2 days ago, was mild and remitted after a few hours. Recurred last evening while she was at home, states that she feels that she is spinning, denies diplopia, dysarthria, dysphagia, focal or lateralized weakness, no falls or headache. Denies lightheadedness. No fevers or chills. Denies dysuria, dyspnea, cough, abdominal pain or vomiting. Symptoms are better with staying still, worse with sudden movements or bending over. .            Past Medical History:   Diagnosis Date    Anxiety disorder     Colitis     Diabetes ([facility] [address]     Diarrhea     Hearing reduced     Hypertension     Muscle weakness     Vision decreased        Past Surgical History:   Procedure Laterality Date    HX DILATION AND CURETTAGE           Family History:   Problem Relation Age of Onset    Headache Mother    Angie Pheasant Migraines Mother     Heart Disease Mother     Heart Disease Father     Cancer Sister     Hypertension Sister        Social History     Socioeconomic History    Marital status:      Spouse name: Not on file    Number of children: Not on file    Years of education: Not on file    Highest education level: Not on file   Occupational History    Not on file   Social Needs    Financial resource strain: Not on file    Food insecurity     Worry: Not on file     Inability: Not on file    Transportation needs     Medical: Not on file     Non-medical: Not on file   Tobacco Use    Smoking status: Former Smoker    Smokeless tobacco: Former User   Substance and Sexual Activity    Alcohol use: Never     Frequency: Never    Drug use: Never    Sexual activity: Yes   Lifestyle    Physical activity     Days per week: Not on file     Minutes per session: Not on file    Stress: Not on file   Relationships    Social connections Talks on phone: Not on file     Gets together: Not on file     Attends Jehovah's witness service: Not on file     Active member of club or organization: Not on file     Attends meetings of clubs or organizations: Not on file     Relationship status: Not on file    Intimate partner violence     Fear of current or ex partner: Not on file     Emotionally abused: Not on file     Physically abused: Not on file     Forced sexual activity: Not on file   Other Topics Concern    Not on file   Social History Narrative    Not on file         ALLERGIES: Macrobid [nitrofurantoin monohyd/m-cryst] and Pcn [penicillins]    Review of Systems   Constitutional: Negative for chills and fever. Eyes: Negative for photophobia. Respiratory: Negative for cough and shortness of breath. Cardiovascular: Negative for chest pain and leg swelling. Gastrointestinal: Negative for abdominal pain, nausea and vomiting. Genitourinary: Negative for dysuria. Musculoskeletal: Negative for back pain. Neurological: Positive for dizziness. Negative for syncope, speech difficulty, light-headedness, numbness and headaches. Psychiatric/Behavioral: Negative for confusion. All other systems reviewed and are negative. Vitals:    05/10/21 1419   BP: (!) 183/82   Pulse: 62   Resp: 16   Temp: 97.8 °F (36.6 °C)   SpO2: 97%   Weight: 65.3 kg (144 lb)   Height: 5' 3\" (1.6 m)            Physical Exam  Vitals signs reviewed. Constitutional:       General: She is not in acute distress. Appearance: She is not toxic-appearing. HENT:      Head: Normocephalic and atraumatic. Mouth/Throat:      Mouth: Mucous membranes are moist.   Eyes:      General: Vision grossly intact. Gaze aligned appropriately. Extraocular Movements: Extraocular movements intact. Neck:      Musculoskeletal: Normal range of motion. Cardiovascular:      Rate and Rhythm: Normal rate and regular rhythm. Heart sounds: Normal heart sounds.    Pulmonary: Effort: Pulmonary effort is normal. No respiratory distress. Breath sounds: Normal breath sounds. Abdominal:      Palpations: Abdomen is soft. Tenderness: There is no abdominal tenderness. Musculoskeletal:      Right lower leg: No edema. Left lower leg: No edema. Skin:     Capillary Refill: Capillary refill takes less than 2 seconds. Neurological:      General: No focal deficit present. Mental Status: She is alert and oriented to person, place, and time. Cranial Nerves: Cranial nerves are intact. No cranial nerve deficit. Sensory: No sensory deficit. Motor: No weakness or pronator drift. Coordination: Coordination is intact. Coordination normal.      Gait: Gait normal.   Psychiatric:         Mood and Affect: Mood normal.          MDM          MEDICAL DECISION MAKIN y.o. female presents with Dizziness    Differential diagnosis includes but not limited to: Peripheral vertigo versus central vertigo. I strongly favor peripheral vertigo given that she has leftward nystagmus, easily reproducible symptoms with leftward gaze, no associated neurological symptoms. Her symptoms are also rather mild, she is able to ambulate without a walker although she normally uses one at home at   Her daughter is present with her and providing collateral.  Patient is extremely cognitively sharp and does not have any new deficits according to her daughter. I am not able to perceive any deficits on my exam as well. The ED imaging is negative. LABORATORY TESTS:  Labs Reviewed   CBC WITH AUTOMATED DIFF - Abnormal; Notable for the following components:       Result Value    ABS.  LYMPHOCYTES 3.7 (*)     All other components within normal limits   URINALYSIS W/MICROSCOPIC - Abnormal; Notable for the following components:    Blood TRACE (*)     All other components within normal limits   METABOLIC PANEL, COMPREHENSIVE - Abnormal; Notable for the following components:    Potassium 3.4 (*) Glucose 130 (*)     A-G Ratio 0.9 (*)     All other components within normal limits   URINE CULTURE HOLD SAMPLE       IMAGING RESULTS:  CT HEAD WO CONT   Final Result   No acute intracranial hemorrhage, mass or infarct. MEDICATIONS GIVEN:  Medications   scopolamine (TRANSDERM-SCOP) 1 mg over 3 days 1 Patch (1 Patch TransDERmal Apply Patch 5/10/21 1559)   meclizine (ANTIVERT) tablet 25 mg (25 mg Oral Given 5/10/21 1559)       PROGRESS NOTE:    Patient's symptoms have improved after treatment       EKG:  Reviewed     CONSULTS:  Discussed with family at bedside    IMPRESSION:  1.  Vertigo        PLAN:  -   Discharge  Discharge Medication List as of 5/10/2021  5:19 PM        Follow-up Information     Follow up With Specialties Details Why Contact Info    Dorcas Pickard MD Family Medicine Schedule an appointment as soon as possible for a visit in 3 days  [address] [place] [id number]  [phone]          Return precautions given      Maxime Shafer MD           Procedures

## 2021-05-11 LAB
ATRIAL RATE: 60 BPM
CALCULATED P AXIS, ECG09: 52 DEGREES
CALCULATED R AXIS, ECG10: 20 DEGREES
CALCULATED T AXIS, ECG11: 43 DEGREES
DIAGNOSIS, 93000: NORMAL
P-R INTERVAL, ECG05: 168 MS
Q-T INTERVAL, ECG07: 448 MS
QRS DURATION, ECG06: 90 MS
QTC CALCULATION (BEZET), ECG08: 448 MS
VENTRICULAR RATE, ECG03: 60 BPM

## 2022-05-13 ENCOUNTER — HOSPITAL ENCOUNTER (EMERGENCY)
Age: 87
Discharge: HOME | End: 2022-05-13
Attending: EMERGENCY MEDICINE
Payer: MEDICARE

## 2022-05-13 VITALS
BODY MASS INDEX: 26.09 KG/M2 | RESPIRATION RATE: 20 BRPM | DIASTOLIC BLOOD PRESSURE: 73 MMHG | WEIGHT: 147.27 LBS | TEMPERATURE: 97.9 F | HEIGHT: 63 IN | OXYGEN SATURATION: 94 % | SYSTOLIC BLOOD PRESSURE: 194 MMHG | HEART RATE: 70 BPM

## 2022-05-13 DIAGNOSIS — I87.2 VENOUS STASIS DERMATITIS OF BOTH LOWER EXTREMITIES: Primary | ICD-10-CM

## 2022-05-13 DIAGNOSIS — I87.2 VENOUS INSUFFICIENCY, PERIPHERAL: ICD-10-CM

## 2022-05-13 PROCEDURE — 99283 EMERGENCY DEPT VISIT LOW MDM: CPT

## 2022-05-13 NOTE — ED PROVIDER NOTES
The history is provided by the patient. Ankle swelling   This is a recurrent problem. The current episode started more than 2 days ago. The problem occurs constantly. The problem has been gradually worsening. Pain location: bilateral lower legs. The patient is experiencing no pain. She has tried nothing for the symptoms. There has been no history of extremity trauma. Past Medical History:   Diagnosis Date    Anxiety disorder     Colitis     Diabetes ([address]     Diarrhea     Hearing reduced     Hypertension     Muscle weakness     Vision decreased        Past Surgical History:   Procedure Laterality Date    HX DILATION AND CURETTAGE           Family History:   Problem Relation Age of Onset    Headache Mother    Reggie Jones Migraines Mother     Heart Disease Mother     Heart Disease Father     Cancer Sister     Hypertension Sister        Social History     Socioeconomic History    Marital status:      Spouse name: Not on file    Number of children: Not on file    Years of education: Not on file    Highest education level: Not on file   Occupational History    Not on file   Tobacco Use    Smoking status: Former Smoker    Smokeless tobacco: Former User   Substance and Sexual Activity    Alcohol use: Never    Drug use: Never    Sexual activity: Yes   Other Topics Concern    Not on file   Social History Narrative    Not on file     Social Determinants of Health     Financial Resource Strain:     Difficulty of Paying Living Expenses: Not on file   Food Insecurity:     Worried About [address] in the Last Year: Not on file    [address] in the Last Year: Not on file   Transportation Needs:     Lack of Transportation (Medical): Not on file    Lack of Transportation (Non-Medical):  Not on file   Physical Activity:     Days of Exercise per Week: Not on file    Minutes of Exercise per Session: Not on file   Stress:     Feeling of Stress : Not on file   Social Connections:     Frequency of Communication with Friends and Family: Not on file    Frequency of Social Gatherings with Friends and Family: Not on file    Attends Christian Services: Not on file    Active Member of Clubs or Organizations: Not on file    Attends Club or Organization Meetings: Not on file    Marital Status: Not on file   Intimate Partner Violence:     Fear of Current or Ex-Partner: Not on file    Emotionally Abused: Not on file    Physically Abused: Not on file    Sexually Abused: Not on file   Housing Stability:     Unable to Pay for Housing in the Last Year: Not on file    Number of [place] in the Last Year: Not on file    Unstable Housing in the Last Year: Not on file         ALLERGIES: Macrobid [nitrofurantoin monohyd/m-cryst] and Pcn [penicillins]    Review of Systems   Respiratory: Negative for chest tightness and shortness of breath. Cardiovascular: Positive for leg swelling. Negative for chest pain. All other systems reviewed and are negative. Vitals:    22 1754   BP: (!) 194/73   Pulse: 70   Resp: 20   Temp: 97.9 °F (36.6 °C)   SpO2: 94%   Weight: 66.8 kg (147 lb 4.3 oz)   Height: 5' 3\" (1.6 m)            Physical Exam  Vitals and nursing note reviewed. Constitutional:       General: She is not in acute distress. Appearance: She is well-developed. HENT:      Head: Normocephalic and atraumatic. Eyes:      Conjunctiva/sclera: Conjunctivae normal.   Cardiovascular:      Rate and Rhythm: Normal rate and regular rhythm. Heart sounds: Normal heart sounds. Pulmonary:      Effort: Pulmonary effort is normal. No respiratory distress. Breath sounds: Normal breath sounds. Abdominal:      General: There is no distension. Musculoskeletal:         General: No deformity. Normal range of motion. Cervical back: Neck supple. Right lower le+ Pitting Edema present. Left lower le+ Pitting Edema present. Skin:     General: Skin is warm and dry. Neurological:      Mental Status: She is alert. Cranial Nerves: No cranial nerve deficit. Psychiatric:         Behavior: Behavior normal.          MDM     80 y.o. female presents with symmetric bilateral leg swelling. Has occurred chronically but worsened in last few days. She has skin changes that are consistent with chronic venous stasis. No signs of thrombus with equal swelling, no s/s of heart failure. Will start on compression therapy and elevation, referral to lymphedema clinic for ongoing care. Do not feel she requires further emergent workup and will continue supportive care and follow up. Plan to follow up with PCP as needed and return precautions discussed for worsening or new concerning symptoms.      Procedures

## 2022-05-13 NOTE — ED TRIAGE NOTES
Pt ambulates to treatment area with use of rollator she states that over the past week her lower leg edema is more than it usually has been since December 2021. She denies any chest pain or SOB with the swelling.   She notes that she has not been put on any medications for this nor does she have a cardiologist

## 2022-11-04 ENCOUNTER — APPOINTMENT (OUTPATIENT)
Dept: CT IMAGING | Age: 87
End: 2022-11-04
Attending: STUDENT IN AN ORGANIZED HEALTH CARE EDUCATION/TRAINING PROGRAM
Payer: MEDICARE

## 2022-11-04 ENCOUNTER — APPOINTMENT (OUTPATIENT)
Dept: GENERAL RADIOLOGY | Age: 87
End: 2022-11-04
Attending: STUDENT IN AN ORGANIZED HEALTH CARE EDUCATION/TRAINING PROGRAM
Payer: MEDICARE

## 2022-11-04 ENCOUNTER — APPOINTMENT (OUTPATIENT)
Dept: GENERAL RADIOLOGY | Age: 87
End: 2022-11-04
Attending: PHYSICIAN ASSISTANT
Payer: MEDICARE

## 2022-11-04 ENCOUNTER — HOSPITAL ENCOUNTER (OUTPATIENT)
Age: 87
Setting detail: OBSERVATION
Discharge: HOME | End: 2022-11-08
Attending: STUDENT IN AN ORGANIZED HEALTH CARE EDUCATION/TRAINING PROGRAM | Admitting: INTERNAL MEDICINE
Payer: MEDICARE

## 2022-11-04 DIAGNOSIS — S82.001A CLOSED NONDISPLACED FRACTURE OF RIGHT PATELLA, UNSPECIFIED FRACTURE MORPHOLOGY, INITIAL ENCOUNTER: ICD-10-CM

## 2022-11-04 DIAGNOSIS — S82.891A CLOSED FRACTURE OF RIGHT ANKLE, INITIAL ENCOUNTER: ICD-10-CM

## 2022-11-04 DIAGNOSIS — S82.892A CLOSED FRACTURE OF LEFT ANKLE, INITIAL ENCOUNTER: Primary | ICD-10-CM

## 2022-11-04 DIAGNOSIS — S82.102A CLOSED FRACTURE OF PROXIMAL END OF LEFT TIBIA, UNSPECIFIED FRACTURE MORPHOLOGY, INITIAL ENCOUNTER: ICD-10-CM

## 2022-11-04 PROBLEM — S82.091A CLOSED PATELLAR SLEEVE FRACTURE OF RIGHT KNEE: Status: ACTIVE | Noted: 2022-11-04

## 2022-11-04 LAB
ALBUMIN SERPL-MCNC: 3.9 G/DL (ref 3.5–5)
ALBUMIN/GLOB SERPL: 1 {RATIO} (ref 1.1–2.2)
ALP SERPL-CCNC: 72 U/L (ref 45–117)
ALT SERPL-CCNC: 28 U/L (ref 12–78)
ANION GAP SERPL CALC-SCNC: 7 MMOL/L (ref 5–15)
AST SERPL-CCNC: 25 U/L (ref 15–37)
BASOPHILS # BLD: 0.1 K/UL (ref 0–0.1)
BASOPHILS NFR BLD: 1 % (ref 0–1)
BILIRUB SERPL-MCNC: 0.4 MG/DL (ref 0.2–1)
BUN SERPL-MCNC: 16 MG/DL (ref 6–20)
BUN/CREAT SERPL: 23 (ref 12–20)
CALCIUM SERPL-MCNC: 9.4 MG/DL (ref 8.5–10.1)
CHLORIDE SERPL-SCNC: 102 MMOL/L (ref 97–108)
CO2 SERPL-SCNC: 28 MMOL/L (ref 21–32)
COMMENT, HOLDF: NORMAL
CREAT SERPL-MCNC: 0.71 MG/DL (ref 0.55–1.02)
DIFFERENTIAL METHOD BLD: ABNORMAL
EOSINOPHIL # BLD: 0.1 K/UL (ref 0–0.4)
EOSINOPHIL NFR BLD: 1 % (ref 0–7)
ERYTHROCYTE [DISTWIDTH] IN BLOOD BY AUTOMATED COUNT: 13.1 % (ref 11.5–14.5)
GLOBULIN SER CALC-MCNC: 3.8 G/DL (ref 2–4)
GLUCOSE SERPL-MCNC: 152 MG/DL (ref 65–100)
HCT VFR BLD AUTO: 33.5 % (ref 35–47)
HGB BLD-MCNC: 11.2 G/DL (ref 11.5–16)
IMM GRANULOCYTES # BLD AUTO: 0 K/UL (ref 0–0.04)
IMM GRANULOCYTES NFR BLD AUTO: 0 % (ref 0–0.5)
LYMPHOCYTES # BLD: 2.1 K/UL (ref 0.8–3.5)
LYMPHOCYTES NFR BLD: 20 % (ref 12–49)
MCH RBC QN AUTO: 31.3 PG (ref 26–34)
MCHC RBC AUTO-ENTMCNC: 33.4 G/DL (ref 30–36.5)
MCV RBC AUTO: 93.6 FL (ref 80–99)
MONOCYTES # BLD: 0.7 K/UL (ref 0–1)
MONOCYTES NFR BLD: 6 % (ref 5–13)
NEUTS SEG # BLD: 7.4 K/UL (ref 1.8–8)
NEUTS SEG NFR BLD: 72 % (ref 32–75)
NRBC # BLD: 0 K/UL (ref 0–0.01)
NRBC BLD-RTO: 0 PER 100 WBC
PLATELET # BLD AUTO: 225 K/UL (ref 150–400)
PMV BLD AUTO: 9.8 FL (ref 8.9–12.9)
POTASSIUM SERPL-SCNC: 4 MMOL/L (ref 3.5–5.1)
PROT SERPL-MCNC: 7.7 G/DL (ref 6.4–8.2)
RBC # BLD AUTO: 3.58 M/UL (ref 3.8–5.2)
SAMPLES BEING HELD,HOLD: NORMAL
SODIUM SERPL-SCNC: 137 MMOL/L (ref 136–145)
WBC # BLD AUTO: 10.4 K/UL (ref 3.6–11)

## 2022-11-04 PROCEDURE — 73552 X-RAY EXAM OF FEMUR 2/>: CPT

## 2022-11-04 PROCEDURE — 84443 ASSAY THYROID STIM HORMONE: CPT

## 2022-11-04 PROCEDURE — 80053 COMPREHEN METABOLIC PANEL: CPT

## 2022-11-04 PROCEDURE — 73562 X-RAY EXAM OF KNEE 3: CPT

## 2022-11-04 PROCEDURE — 73610 X-RAY EXAM OF ANKLE: CPT

## 2022-11-04 PROCEDURE — 36415 COLL VENOUS BLD VENIPUNCTURE: CPT

## 2022-11-04 PROCEDURE — G0378 HOSPITAL OBSERVATION PER HR: HCPCS

## 2022-11-04 PROCEDURE — 99285 EMERGENCY DEPT VISIT HI MDM: CPT

## 2022-11-04 PROCEDURE — 73700 CT LOWER EXTREMITY W/O DYE: CPT

## 2022-11-04 PROCEDURE — 85025 COMPLETE CBC W/AUTO DIFF WBC: CPT

## 2022-11-04 PROCEDURE — 74011250637 HC RX REV CODE- 250/637: Performed by: STUDENT IN AN ORGANIZED HEALTH CARE EDUCATION/TRAINING PROGRAM

## 2022-11-04 RX ORDER — ONDANSETRON 4 MG/1
4 TABLET, ORALLY DISINTEGRATING ORAL
Status: DISCONTINUED | OUTPATIENT
Start: 2022-11-04 | End: 2022-11-08 | Stop reason: HOSPADM

## 2022-11-04 RX ORDER — ACETAMINOPHEN 500 MG
1000 TABLET ORAL
Status: COMPLETED | OUTPATIENT
Start: 2022-11-04 | End: 2022-11-04

## 2022-11-04 RX ORDER — HYDROCODONE BITARTRATE AND ACETAMINOPHEN 5; 325 MG/1; MG/1
1 TABLET ORAL
Status: DISCONTINUED | OUTPATIENT
Start: 2022-11-04 | End: 2022-11-05

## 2022-11-04 RX ORDER — POLYETHYLENE GLYCOL 3350 17 G/17G
17 POWDER, FOR SOLUTION ORAL DAILY PRN
Status: DISCONTINUED | OUTPATIENT
Start: 2022-11-04 | End: 2022-11-06

## 2022-11-04 RX ORDER — ENOXAPARIN SODIUM 100 MG/ML
40 INJECTION SUBCUTANEOUS DAILY
Status: DISCONTINUED | OUTPATIENT
Start: 2022-11-05 | End: 2022-11-08 | Stop reason: HOSPADM

## 2022-11-04 RX ORDER — ONDANSETRON 2 MG/ML
4 INJECTION INTRAMUSCULAR; INTRAVENOUS
Status: DISCONTINUED | OUTPATIENT
Start: 2022-11-04 | End: 2022-11-08 | Stop reason: HOSPADM

## 2022-11-04 RX ORDER — ACETAMINOPHEN 650 MG/1
650 SUPPOSITORY RECTAL
Status: DISCONTINUED | OUTPATIENT
Start: 2022-11-04 | End: 2022-11-08 | Stop reason: HOSPADM

## 2022-11-04 RX ORDER — SODIUM CHLORIDE 0.9 % (FLUSH) 0.9 %
5-40 SYRINGE (ML) INJECTION EVERY 8 HOURS
Status: DISCONTINUED | OUTPATIENT
Start: 2022-11-04 | End: 2022-11-08 | Stop reason: HOSPADM

## 2022-11-04 RX ORDER — SODIUM CHLORIDE 0.9 % (FLUSH) 0.9 %
5-40 SYRINGE (ML) INJECTION AS NEEDED
Status: DISCONTINUED | OUTPATIENT
Start: 2022-11-04 | End: 2022-11-08 | Stop reason: HOSPADM

## 2022-11-04 RX ORDER — ACETAMINOPHEN 325 MG/1
650 TABLET ORAL
Status: DISCONTINUED | OUTPATIENT
Start: 2022-11-04 | End: 2022-11-08 | Stop reason: HOSPADM

## 2022-11-04 RX ADMIN — ACETAMINOPHEN 1000 MG: 500 TABLET ORAL at 19:45

## 2022-11-04 NOTE — ED NOTES
Verbal shift change report given to [employer] (oncoming nurse) by [address] [place] [address] (offgoing nurse). Report included the following information SBAR, Kardex, ED Summary, [facility] and Recent Results.

## 2022-11-04 NOTE — ED PROVIDER NOTES
EMERGENCY DEPARTMENT HISTORY AND PHYSICAL EXAM      Date: 11/4/2022  Patient Name: Maggie Aguero    History of Presenting Illness     HPI: Maggie Aguero, 80 y.o. female with past medical history of bilateral knee arthritis, presents to the ED with cc of fall. Patient reports while walking with her walker, her knees \"gave out\" on her, causing her to fall forward onto both her knees. Denies lightheadedness/dizziness, syncope. States that she did not hit her head or lose consciousness. She is currently endorsing increased pain in her left knee, with range of motion at the joint limited by pain. Patient also feels that she may have sprained both of her ankles, as she is experiencing increased pain in both these joints right now. She sustained a superficial cut to the bottom of her big toe on the right foot, but denies any pain in her foot/toe. Patient reports last tetanus shot was within the last 5 years. PCP: None    No current facility-administered medications on file prior to encounter. Current Outpatient Medications on File Prior to Encounter   Medication Sig Dispense Refill    levothyroxine (SYNTHROID) 112 mcg tablet Take 56 mcg by mouth Daily (before breakfast). cyanocobalamin 1,000 mcg tablet Take 1,000 mcg by mouth daily. cholecalciferol (Vitamin D3) 25 mcg (1,000 unit) cap Take  by mouth daily. amLODIPine (NORVASC) 5 mg tablet Take 5 mg by mouth daily. losartan (COZAAR) 100 mg tablet Take 100 mg by mouth nightly. atorvastatin (LIPITOR) 10 mg tablet Take 10 mg by mouth daily. METFORMIN HCL (METFORMIN PO) Take 1,000 mg by mouth at bedtime as directed for dose pack. METOPROLOL SUCCINATE PO Take 50 mg by mouth daily. esomeprazole (NEXIUM) 40 mg capsule Take 20 mg by mouth two (2) times a day. furosemide (LASIX) 20 mg tablet Take  by mouth daily. aspirin delayed-release (ECOTRIN LOW STRENGTH) 81 mg tablet Take  by mouth daily.       folic acid 042 mcg tablet Take 400 mcg by mouth daily. Past History     Past Medical History:  Past Medical History:   Diagnosis Date    Anxiety disorder     Colitis     Diabetes ([address]     Diarrhea     Hearing reduced     Hypertension     Muscle weakness     Vision decreased        Past Surgical History:  Past Surgical History:   Procedure Laterality Date    HX DILATION AND CURETTAGE         Family History:  Family History   Problem Relation Age of Onset    Headache Mother     Migraines Mother     Heart Disease Mother     Heart Disease Father     Cancer Sister     Hypertension Sister        Social History:  Social History     Tobacco Use    Smoking status: Former    Smokeless tobacco: Former   Substance Use Topics    Alcohol use: Never    Drug use: Never       Allergies: Allergies   Allergen Reactions    Macrobid [Nitrofurantoin Monohyd/M-Cryst] Anaphylaxis    Pcn [Penicillins] Hives         Review of Systems   Review of Systems   Constitutional:  Negative for chills and fever. HENT:  Negative for congestion and rhinorrhea. Eyes:  Negative for visual disturbance. Respiratory:  Negative for chest tightness and shortness of breath. Cardiovascular:  Negative for chest pain and palpitations. Gastrointestinal:  Negative for abdominal pain, diarrhea, nausea and vomiting. Genitourinary:  Negative for dysuria, flank pain and hematuria. Musculoskeletal:  Positive for arthralgias and myalgias. Negative for back pain and neck pain. Skin:  Positive for wound. Negative for rash. Allergic/Immunologic: Negative for immunocompromised state. Neurological:  Negative for dizziness, speech difficulty, weakness and headaches. Hematological:  Negative for adenopathy. Psychiatric/Behavioral:  Negative for dysphoric mood and suicidal ideas. Physical Exam   Physical Exam  Vitals and nursing note reviewed. Constitutional:       General: She is not in acute distress.      Appearance: She is not ill-appearing or toxic-appearing. HENT:      Head: Normocephalic and atraumatic. Nose: Nose normal.      Mouth/Throat:      Mouth: Mucous membranes are moist.   Eyes:      Extraocular Movements: Extraocular movements intact. Pupils: Pupils are equal, round, and reactive to light. Cardiovascular:      Rate and Rhythm: Normal rate and regular rhythm. Pulses: Normal pulses. Pulmonary:      Effort: Pulmonary effort is normal.      Breath sounds: No stridor. No wheezing or rhonchi. Abdominal:      General: Abdomen is flat. There is no distension. Tenderness: There is no abdominal tenderness. Musculoskeletal:      Cervical back: Normal range of motion and neck supple. Right hip: Normal.      Left hip: Normal.      Right knee: Normal range of motion. Tenderness present. Left knee: No deformity, effusion or erythema. Decreased range of motion. Tenderness present. Right ankle: No swelling or deformity. Tenderness present. Decreased range of motion. Left ankle: No swelling or deformity. Tenderness present. Decreased range of motion. Feet:       Comments: NVI in distal aspects of bilateral lower extremities. Skin:     General: Skin is warm and dry. Neurological:      General: No focal deficit present. Mental Status: She is alert and oriented to person, place, and time.    Psychiatric:         Judgment: Judgment normal.       Diagnostic Study Results     Labs -     Recent Results (from the past 24 hour(s))   CBC WITH AUTOMATED DIFF    Collection Time: 11/04/22  9:07 PM   Result Value Ref Range    WBC 10.4 3.6 - 11.0 K/uL    RBC 3.58 (L) 3.80 - 5.20 M/uL    HGB 11.2 (L) 11.5 - 16.0 g/dL    HCT 33.5 (L) 35.0 - 47.0 %    MCV 93.6 80.0 - 99.0 FL    MCH 31.3 26.0 - 34.0 PG    MCHC 33.4 30.0 - 36.5 g/dL    RDW 13.1 11.5 - 14.5 %    PLATELET 526 085 - 741 K/uL    MPV 9.8 8.9 - 12.9 FL    NRBC 0.0 0  WBC    ABSOLUTE NRBC 0.00 0.00 - 0.01 K/uL    NEUTROPHILS 72 32 - 75 % LYMPHOCYTES 20 12 - 49 %    MONOCYTES 6 5 - 13 %    EOSINOPHILS 1 0 - 7 %    BASOPHILS 1 0 - 1 %    IMMATURE GRANULOCYTES 0 0.0 - 0.5 %    ABS. NEUTROPHILS 7.4 1.8 - 8.0 K/UL    ABS. LYMPHOCYTES 2.1 0.8 - 3.5 K/UL    ABS. MONOCYTES 0.7 0.0 - 1.0 K/UL    ABS. EOSINOPHILS 0.1 0.0 - 0.4 K/UL    ABS. BASOPHILS 0.1 0.0 - 0.1 K/UL    ABS. IMM. GRANS. 0.0 0.00 - 0.04 K/UL    DF AUTOMATED     METABOLIC PANEL, COMPREHENSIVE    Collection Time: 11/04/22  9:07 PM   Result Value Ref Range    Sodium 137 136 - 145 mmol/L    Potassium 4.0 3.5 - 5.1 mmol/L    Chloride 102 97 - 108 mmol/L    CO2 28 21 - 32 mmol/L    Anion gap 7 5 - 15 mmol/L    Glucose 152 (H) 65 - 100 mg/dL    BUN 16 6 - 20 MG/DL    Creatinine 0.71 0.55 - 1.02 MG/DL    BUN/Creatinine ratio 23 (H) 12 - 20      eGFR >60 >60 ml/min/1.73m2    Calcium 9.4 8.5 - 10.1 MG/DL    Bilirubin, total 0.4 0.2 - 1.0 MG/DL    ALT (SGPT) 28 12 - 78 U/L    AST (SGOT) 25 15 - 37 U/L    Alk. phosphatase 72 45 - 117 U/L    Protein, total 7.7 6.4 - 8.2 g/dL    Albumin 3.9 3.5 - 5.0 g/dL    Globulin 3.8 2.0 - 4.0 g/dL    A-G Ratio 1.0 (L) 1.1 - 2.2     SAMPLES BEING HELD    Collection Time: 11/04/22  9:07 PM   Result Value Ref Range    SAMPLES BEING HELD  1SST, 1RED, 1BLU, 1DK GRN     COMMENT        Add-on orders for these samples will be processed based on acceptable specimen integrity and analyte stability, which may vary by analyte. Radiologic Studies -   XR KNEE RT 3 V   Final Result   Nondisplaced transverse fracture of patella. XR ANKLE LT MIN 3 V   Final Result   Nondisplaced fractures of distal tibial and fibular diametaphyses   and distal tibial metaphysis      XR KNEE LT 3 V   Final Result   Nondisplaced fracture proximal tibial diametaphysis. XR ANKLE RT MIN 3 V   Final Result   Acute nondisplaced fractures of the medial and lateral malleoli. .      CT LOW EXT LT WO CONT    (Results Pending)     CT Results  (Last 48 hours)      None          CXR Results  (Last 48 hours)      None            Medical Decision Making   I, Ophelia Roberts MD-- am the first provider for this patient, and I am the attending of record for this patient encounter. I reviewed the vital signs, available nursing notes, past medical history, past surgical history, family history and social history. Vital Signs-Reviewed the patient's vital signs. Patient Vitals for the past 24 hrs:   Temp Pulse Resp BP SpO2   11/04/22 2051 -- -- -- (!) 152/60 97 %   11/04/22 2012 -- -- -- (!) 155/60 96 %   11/04/22 1936 -- -- -- (!) 140/57 95 %   11/04/22 1906 -- -- -- -- 96 %   11/04/22 1904 98 °F (36.7 °C) 64 16 (!) 166/63 96 %     Records Reviewed: Nursing Notes and Old Medical Records    Provider Notes (Medical Decision Making): Will check bilateral knee XRs, bilateral ankles XRs, medicate patient with Tylenol for pain. ED Course as of 11/04/22 2209 Fri Nov 04, 2022 2119 Patient with bilateral ankle fx, L proximal tibial fx. All fxs nondisplaced. Results reviewed with the patient. Will touch base with ortho to make them aware of patient, though patient's fxs are stable and do not require emergent surgical intervention. Will plan to apply splint, and admit to hospitalist. Will check basic blood work and UA.  [JM]   2147 Rt knee XR show nondisplaced transverse fracture of patella. Ortho requested bilateral posterior long leg splints with sugar tong. Will plan to admit to hospitalist service. [JM]      ED Course User Index  [JM] Arely Rowan MD     Perfect Serve Consult for Admission  10:10 PM    ED Room Number: ER15/15  Patient Name and age:  Nikki Lau 80 y.o.  female  Working Diagnosis:   1. Closed fracture of left ankle, initial encounter    2. Closed fracture of right ankle, initial encounter    3. Closed fracture of proximal end of left tibia, unspecified fracture morphology, initial encounter    4.  Closed nondisplaced fracture of right patella, unspecified fracture morphology, initial encounter COVID-19 Suspicion:  no  Sepsis present:  no  Reassessment needed: no  Code Status:  Full Code  Readmission: no  Isolation Requirements:  no  Recommended Level of Care:  med/surg  Department:[facility] ED - [phone]        ED Course:   Initial assessment performed. The patient's presenting problems have been discussed, and they are in agreement with the care plan formulated and outlined with them. I have encouraged them to ask questions as they arise throughout their visit. Edmond Damon MD      Disposition:  Admit    Diagnosis     Clinical Impression:   1. Closed fracture of left ankle, initial encounter    2. Closed fracture of right ankle, initial encounter    3. Closed fracture of proximal end of left tibia, unspecified fracture morphology, initial encounter    4. Closed nondisplaced fracture of right patella, unspecified fracture morphology, initial encounter        Attestations:    Edmond Damon MD    Please note that this dictation was completed with [employer], the computer voice recognition software. Quite often unanticipated grammatical, syntax, homophones, and other interpretive errors are inadvertently transcribed by the computer software. Please disregard these errors. Please excuse any errors that have escaped final proofreading. Thank you.

## 2022-11-04 NOTE — ED TRIAGE NOTES
Pt arrives via EMS with c/o GLF after legs \"gave out\", left knee pain, and bilateral ankle pain. Pt denies LOC or hitting head. Pt denies dizziness, chest pain, SOB, headache, blurred vision. Pt has small laceration on right 1st toe. Pt denies taking blood thinners.     EMS reports:  Blood Sugar:  132  /75

## 2022-11-05 LAB
APPEARANCE UR: CLEAR
BACTERIA URNS QL MICRO: ABNORMAL /HPF
BILIRUB UR QL: NEGATIVE
COLOR UR: ABNORMAL
EPITH CASTS URNS QL MICRO: ABNORMAL /LPF
GLUCOSE BLD STRIP.AUTO-MCNC: 131 MG/DL (ref 65–117)
GLUCOSE BLD STRIP.AUTO-MCNC: 153 MG/DL (ref 65–117)
GLUCOSE BLD STRIP.AUTO-MCNC: 170 MG/DL (ref 65–117)
GLUCOSE UR STRIP.AUTO-MCNC: NEGATIVE MG/DL
HGB UR QL STRIP: NEGATIVE
HYALINE CASTS URNS QL MICRO: ABNORMAL /LPF (ref 0–2)
KETONES UR QL STRIP.AUTO: NEGATIVE MG/DL
LEUKOCYTE ESTERASE UR QL STRIP.AUTO: ABNORMAL
NITRITE UR QL STRIP.AUTO: NEGATIVE
PH UR STRIP: 7 [PH] (ref 5–8)
PROT UR STRIP-MCNC: NEGATIVE MG/DL
RBC #/AREA URNS HPF: ABNORMAL /HPF (ref 0–5)
SERVICE CMNT-IMP: ABNORMAL
SP GR UR REFRACTOMETRY: 1.01 (ref 1–1.03)
TSH SERPL DL<=0.05 MIU/L-ACNC: 2.57 UIU/ML (ref 0.36–3.74)
UA: UC IF INDICATED,UAUC: ABNORMAL
UROBILINOGEN UR QL STRIP.AUTO: 1 EU/DL (ref 0.2–1)
WBC URNS QL MICRO: ABNORMAL /HPF (ref 0–4)

## 2022-11-05 PROCEDURE — 74011250637 HC RX REV CODE- 250/637: Performed by: NURSE PRACTITIONER

## 2022-11-05 PROCEDURE — 74011250636 HC RX REV CODE- 250/636: Performed by: INTERNAL MEDICINE

## 2022-11-05 PROCEDURE — 87086 URINE CULTURE/COLONY COUNT: CPT

## 2022-11-05 PROCEDURE — 87186 SC STD MICRODIL/AGAR DIL: CPT

## 2022-11-05 PROCEDURE — G0378 HOSPITAL OBSERVATION PER HR: HCPCS

## 2022-11-05 PROCEDURE — 74011250636 HC RX REV CODE- 250/636: Performed by: HOSPITALIST

## 2022-11-05 PROCEDURE — 81001 URINALYSIS AUTO W/SCOPE: CPT

## 2022-11-05 PROCEDURE — 97161 PT EVAL LOW COMPLEX 20 MIN: CPT

## 2022-11-05 PROCEDURE — 82962 GLUCOSE BLOOD TEST: CPT

## 2022-11-05 PROCEDURE — 87077 CULTURE AEROBIC IDENTIFY: CPT

## 2022-11-05 PROCEDURE — 74011250637 HC RX REV CODE- 250/637: Performed by: INTERNAL MEDICINE

## 2022-11-05 PROCEDURE — 74011000250 HC RX REV CODE- 250: Performed by: HOSPITALIST

## 2022-11-05 PROCEDURE — 96372 THER/PROPH/DIAG INJ SC/IM: CPT

## 2022-11-05 PROCEDURE — 97165 OT EVAL LOW COMPLEX 30 MIN: CPT

## 2022-11-05 PROCEDURE — 97530 THERAPEUTIC ACTIVITIES: CPT

## 2022-11-05 PROCEDURE — 96374 THER/PROPH/DIAG INJ IV PUSH: CPT

## 2022-11-05 PROCEDURE — 74011000250 HC RX REV CODE- 250: Performed by: INTERNAL MEDICINE

## 2022-11-05 RX ORDER — AMLODIPINE BESYLATE 5 MG/1
5 TABLET ORAL DAILY
Status: DISCONTINUED | OUTPATIENT
Start: 2022-11-05 | End: 2022-11-08

## 2022-11-05 RX ORDER — OXYCODONE HYDROCHLORIDE 5 MG/1
2.5 TABLET ORAL
Status: DISCONTINUED | OUTPATIENT
Start: 2022-11-05 | End: 2022-11-08 | Stop reason: HOSPADM

## 2022-11-05 RX ORDER — PANTOPRAZOLE SODIUM 40 MG/1
40 TABLET, DELAYED RELEASE ORAL
Status: DISCONTINUED | OUTPATIENT
Start: 2022-11-05 | End: 2022-11-08 | Stop reason: HOSPADM

## 2022-11-05 RX ORDER — DEXTROSE MONOHYDRATE 100 MG/ML
0-250 INJECTION, SOLUTION INTRAVENOUS AS NEEDED
Status: DISCONTINUED | OUTPATIENT
Start: 2022-11-05 | End: 2022-11-08 | Stop reason: HOSPADM

## 2022-11-05 RX ORDER — FUROSEMIDE 20 MG/1
20 TABLET ORAL DAILY
Status: DISCONTINUED | OUTPATIENT
Start: 2022-11-05 | End: 2022-11-08 | Stop reason: HOSPADM

## 2022-11-05 RX ORDER — MAGNESIUM SULFATE 100 %
4 CRYSTALS MISCELLANEOUS AS NEEDED
Status: DISCONTINUED | OUTPATIENT
Start: 2022-11-05 | End: 2022-11-08 | Stop reason: HOSPADM

## 2022-11-05 RX ORDER — ASPIRIN 81 MG/1
81 TABLET ORAL DAILY
Status: DISCONTINUED | OUTPATIENT
Start: 2022-11-05 | End: 2022-11-08 | Stop reason: HOSPADM

## 2022-11-05 RX ORDER — MELATONIN
1000 DAILY
Status: DISCONTINUED | OUTPATIENT
Start: 2022-11-05 | End: 2022-11-08 | Stop reason: HOSPADM

## 2022-11-05 RX ORDER — LANOLIN ALCOHOL/MO/W.PET/CERES
1000 CREAM (GRAM) TOPICAL DAILY
Status: DISCONTINUED | OUTPATIENT
Start: 2022-11-05 | End: 2022-11-08 | Stop reason: HOSPADM

## 2022-11-05 RX ORDER — METOPROLOL SUCCINATE 50 MG/1
50 TABLET, EXTENDED RELEASE ORAL DAILY
Status: DISCONTINUED | OUTPATIENT
Start: 2022-11-05 | End: 2022-11-08

## 2022-11-05 RX ORDER — INSULIN LISPRO 100 [IU]/ML
INJECTION, SOLUTION INTRAVENOUS; SUBCUTANEOUS
Status: DISCONTINUED | OUTPATIENT
Start: 2022-11-05 | End: 2022-11-08 | Stop reason: HOSPADM

## 2022-11-05 RX ORDER — LEVOTHYROXINE SODIUM 112 UG/1
56 TABLET ORAL
Status: DISCONTINUED | OUTPATIENT
Start: 2022-11-05 | End: 2022-11-08

## 2022-11-05 RX ORDER — ACETAMINOPHEN 325 MG/1
650 TABLET ORAL EVERY 6 HOURS
Status: DISCONTINUED | OUTPATIENT
Start: 2022-11-05 | End: 2022-11-08 | Stop reason: HOSPADM

## 2022-11-05 RX ADMIN — CYANOCOBALAMIN TAB 500 MCG 1000 MCG: 500 TAB at 09:53

## 2022-11-05 RX ADMIN — CEFTRIAXONE 1 G: 1 INJECTION, POWDER, FOR SOLUTION INTRAMUSCULAR; INTRAVENOUS at 21:55

## 2022-11-05 RX ADMIN — Medication 1000 UNITS: at 09:53

## 2022-11-05 RX ADMIN — ENOXAPARIN SODIUM 40 MG: 100 INJECTION SUBCUTANEOUS at 09:53

## 2022-11-05 RX ADMIN — AMLODIPINE BESYLATE 5 MG: 5 TABLET ORAL at 09:53

## 2022-11-05 RX ADMIN — SODIUM CHLORIDE, PRESERVATIVE FREE 10 ML: 5 INJECTION INTRAVENOUS at 21:50

## 2022-11-05 RX ADMIN — PANTOPRAZOLE SODIUM 40 MG: 40 TABLET, DELAYED RELEASE ORAL at 09:53

## 2022-11-05 RX ADMIN — ACETAMINOPHEN 650 MG: 325 TABLET, FILM COATED ORAL at 22:14

## 2022-11-05 RX ADMIN — ASPIRIN 81 MG: 81 TABLET, COATED ORAL at 09:53

## 2022-11-05 RX ADMIN — METOPROLOL SUCCINATE 50 MG: 50 TABLET, EXTENDED RELEASE ORAL at 09:53

## 2022-11-05 RX ADMIN — FUROSEMIDE 20 MG: 20 TABLET ORAL at 09:53

## 2022-11-05 RX ADMIN — SODIUM CHLORIDE, PRESERVATIVE FREE 10 ML: 5 INJECTION INTRAVENOUS at 13:00

## 2022-11-05 RX ADMIN — LEVOTHYROXINE SODIUM 56 MCG: 0.11 TABLET ORAL at 09:53

## 2022-11-05 RX ADMIN — ACETAMINOPHEN 650 MG: 325 TABLET, FILM COATED ORAL at 10:50

## 2022-11-05 RX ADMIN — ACETAMINOPHEN 650 MG: 325 TABLET, FILM COATED ORAL at 03:31

## 2022-11-05 RX ADMIN — OXYCODONE 2.5 MG: 5 TABLET ORAL at 16:26

## 2022-11-05 NOTE — PROGRESS NOTES
Orthopedic NP Progress Note      November 5, 2022 10:23 AM     Gary Fatima    Attending Physician: Treatment Team: Attending Provider: Kimberlee Hernandez MD; Consulting Provider: Moreno Escobar MD; Physician Assistant: Smitha Angelo; Consulting Provider: Alberto Charlton DO; Utilization Review: Larry Campa; Care Manager: Castillo Meza     Vital Signs:    Patient Vitals for the past 8 hrs:   BP Temp Pulse Resp SpO2   11/05/22 0304 (!) 152/69 98 °F (36.7 °C) 73 20 95 %     BMI (calculated): 25.7 (11/04/22 1904)    Intake/Output:  No intake/output data recorded. No intake/output data recorded. Pain Control:   Pain Assessment  Pain Scale 1: Numeric (0 - 10)  Pain Intensity 1: 5  Pain Onset 1: 11/4/22  Pain Location 1: Leg  Pain Orientation 1: Left, Right  Pain Description 1: Aching  Pain Intervention(s) 1: MD notified (comment)    LAB:    Recent Labs     11/04/22 2107   HCT 33.5*   HGB 11.2*     Lab Results   Component Value Date/Time    Sodium 137 11/04/2022 09:07 PM    Potassium 4.0 11/04/2022 09:07 PM    Chloride 102 11/04/2022 09:07 PM    CO2 28 11/04/2022 09:07 PM    Glucose 152 (H) 11/04/2022 09:07 PM    BUN 16 11/04/2022 09:07 PM    Creatinine 0.71 11/04/2022 09:07 PM    Calcium 9.4 11/04/2022 09:07 PM       Subjective:  Gary Fatima is a 80 y.o. female s/p GLF yesterday resulting in bilat ankle fractures with R patella fx and L proximal tibia fracture. Pt reports pain was better with PO tylenol, per nursing she did not want narcotics. . Tolerating diet. Objective: General: alert, cooperative, no distress. Neuro/Vascular: CNS Intact. Sensation stable.  Brisk cap refill, 2+ pulses UE/LE  Musculoskeletal:  +ROM UE with splints ablove the knee to bilat LE, able to ROM toes with good cap refill, sensation intact    Skin: warm and dry     [place] - n  Drain -n       PT/OT:   Gait:                      Assessment:    s/p     Active Problems:    Bilateral ankle fractures (11/4/2022) Closed patellar sleeve fracture of right knee (11/4/2022)         Plan:   Right transverse patella fracture  Right medial and lateral malleolus fractures  Left Proximal tibia fracture  Left distal tibia and fibula fractures         - Stable from orthopedic standpoint - non-operative treatment at this time. Watch for anemia due to mutliple fractures  - additional femur images ordered and completed due to several distracting injuries  - CT LLE completed, Dr. Matthew Peterson to review and plan accordingly if able to truly treat proximal tibia fracture nonop we will convert pt over to bilat [employer] once able and she will remain NWB  - Continue bilat LE long leg splints, NWB BLE, ice and elevate with pain management as per orders (I adjusted orders for scheduled tylenol and PRN XOy 2.5mg and discussed the dosing with pt, she may try Oxy later at bed time)  - DVT prophylaxis: Lovenox as per medicine          Plan as per Dr. Matthew Peterson, pt will need placement given she is not able to ambulate at this time.      Signed By: Esthela Capps NP    Orthopedic Nurse Practitioner

## 2022-11-05 NOTE — PROGRESS NOTES
[facility]  [address], [facility] [address]  [phone]         Hospitalist Progress Note        NAME:  Rock Greco   :  1931   MRN:  [id number]    Date/Time:  2022     Patient PCP:  None    Emergency Contact:    Extended Emergency Contact Information  Primary Emergency Contact: [address] Phone: [phone]  Mobile Phone: [phone]  Relation: Daughter      Code: Full Code     Isolation Precautions: There are currently no Active Isolations        Subjective:     REASON FOR VISIT:  Recheck Ankle and knee Fxs     HPI & INTERVAL HISTORY:     Ms. Fiona Pemberton is a 80 y.o. female with history that includes DM 2, dysphagia, HTN, hypothyroid and history of ankle sprains using walker since 2021 presents after GLF at home felt that ankles gave out on her causing to have GLF at home. Fall resulted in bilateral ankle and right knee pain. Right knee and ankles found to be fractured. : Patient was seen and examined with daughter and son-in-law at bedside. She was participating in limited PT OT based on her nonweight bearing status. Overall doing well and in good spirits but has continuous mild to moderate pain of knees and ankles bilaterally due to the fractures from the fall. Pain medications help take the edge off.       ALLERGIES  Allergies   Allergen Reactions    Macrobid [Nitrofurantoin Monohyd/M-Cryst] Anaphylaxis    Pcn [Penicillins] Hives         ROS:  Gen:   Negative and denies fever, chills, fatigue, malaise, generalized weakness  Eyes:  negative  ENT:    negative  Resp:  negative and denies cough, shortness of breath, sputum, pleuritis, hemoptysis, wheezing  CVS:   negative and denies palpitations, CP, dizziness, syncope, edema, PND, ANDERSON, orthopnea  GI:       negative and denies abd pain, nausea, vomit, diarrhea, constipation, GERD, melena, hematochezia  :     denies dysuria  MS:      Lower extremity pain due to the fall and fractures           Objective:      Visit Vitals  BP (!) 152/69   Pulse 73   Temp 98 °F (36.7 °C)   Resp 20   Ht 5' 3\" (1.6 m)   Wt 65.8 kg (145 lb)   SpO2 95%   Breastfeeding No   BMI 25.69 kg/m²       Physical Exam:  General: alert, well appearing, and no distress  Head: Normocephalic, without obvious abnormality, atraumatic  Eyes: PERRL, EOMI, anicteric sclerae, and conjuntiva clear  ENT: lips, mucosa, and tongue normal  Neck: normal, supple, and no tenderness  Lungs: clear to auscultation with good breath sounds and normal respiratory effort  Heart: S1, S2 normal, regular rate, and regular rhythm  Abd: not distended, soft, nontender, BS present and normactive  Ext:  Bilateral immobilizers  Skin: normal skin color, no rashes, and texture normal  Neuro:  alert, oriented x 3, no defects noted in general exam.  Psych: not anxious, cooperative, appropriate affect       Medications:  Current Facility-Administered Medications   Medication Dose Route Frequency    insulin lispro (HUMALOG) injection   SubCUTAneous AC&HS    glucose chewable tablet 16 g  4 Tablet Oral PRN    glucagon (GLUCAGEN) injection 1 mg  1 mg IntraMUSCular PRN    dextrose 10% infusion 0-250 mL  0-250 mL IntraVENous PRN    aspirin delayed-release tablet 81 mg  81 mg Oral DAILY    levothyroxine (SYNTHROID) tablet 56 mcg  56 mcg Oral ACB    metoprolol succinate (TOPROL-XL) XL tablet 50 mg  50 mg Oral DAILY    cyanocobalamin (VITAMIN B12) tablet 1,000 mcg  1,000 mcg Oral DAILY    furosemide (LASIX) tablet 20 mg  20 mg Oral DAILY    cholecalciferol (VITAMIN D3) (1000 Units /25 mcg) tablet 1,000 Units  1,000 Units Oral DAILY    amLODIPine (NORVASC) tablet 5 mg  5 mg Oral DAILY    pantoprazole (PROTONIX) tablet 40 mg  40 mg Oral ACB    sodium chloride (NS) flush 5-40 mL  5-40 mL IntraVENous Q8H    sodium chloride (NS) flush 5-40 mL  5-40 mL IntraVENous PRN    acetaminophen (TYLENOL) tablet 650 mg  650 mg Oral Q6H PRN    Or acetaminophen (TYLENOL) suppository 650 mg  650 mg Rectal Q6H PRN    polyethylene glycol (MIRALAX) packet 17 g  17 g Oral DAILY PRN    ondansetron (ZOFRAN ODT) tablet 4 mg  4 mg Oral Q8H PRN    Or    ondansetron (ZOFRAN) injection 4 mg  4 mg IntraVENous Q6H PRN    enoxaparin (LOVENOX) injection 40 mg  40 mg SubCUTAneous DAILY    HYDROcodone-acetaminophen (NORCO) 5-325 mg per tablet 1 Tablet  1 Tablet Oral Q4H PRN        Labs:  Recent Labs     11/04/22 2107   WBC 10.4   HGB 11.2*   HCT 33.5*        Recent Labs     11/04/22 2107      K 4.0      CO2 28   *   BUN 16   CREA 0.71   CA 9.4   ALB 3.9   TBILI 0.4   ALT 28       Radiology:  XR FEMUR LT 2 V    Result Date: 11/4/2022  No acute abnormality. XR FEMUR RT 2 VS    Result Date: 11/4/2022  No acute abnormality. XR ANKLE RT MIN 3 V    Result Date: 11/4/2022  Acute nondisplaced fractures of the medial and lateral malleoli. Justina Greco XR ANKLE LT MIN 3 V    Result Date: 11/4/2022  Nondisplaced fractures of distal tibial and fibular diametaphyses and distal tibial metaphysis    XR KNEE LT 3 V    Result Date: 11/4/2022  Nondisplaced fracture proximal tibial diametaphysis. XR KNEE RT 3 V    Result Date: 11/4/2022  Nondisplaced transverse fracture of patella. I personally reviewed and interpreted the imaging studies and agree with official reading    The ER course, labs, imaging studies, medications, and consultants notes was reviewed by me on: November 5, 2022         Assessment/Plan:      Bilateral ankle fractures: Acute left nondisplaced  ankle fracture / Acute nondisplaced ankle fracture due to home GLF. Left ankle nondisplaced fractures of distal tibial and fibular diametaphyses and distal tibial metaphysis  Right ankle nondisplaced fractures of the medial and lateral malleoli  Non-operative management per ortho  [place] prn pain    Acute bilateral knee fractures: Acute right patellar nondisplaced closed fracture d/t GLF at home.     Left knee nondisplaced fracture proximal tibial diametaphysis. Right knee nondisplaced transverse fracture of patella. Non-operative management, conservative treatment and pain control as above  Monitor for anemia with multiple fractures  Consult(s): Ortho for fractures    Acute bacterial UTI:  Not the cleanest catch and minimal symptoms but given the fall at home we will go ahead and treat in case is causing weakness  Urine culture. Will start Rocephin. Has allergy to penicillin but has used Keflex in the past with no problem per patient. GLF at home resulting in above fractures. Lives alone  Fall precautions  PT/OT/CM (concerns for returning home alone. May require LTC)    Acquired hypothyroidism. Continue levothyroxine 56 mcg p.o. daily. Check TSH**    Dysphagia. Soft mechanical diet.     DM 2:   BG monitoring and ISS coverage    Body mass index is 25.69 kg/m².:  25.0 - 29.9:  Overweight      F: None   E: Stable and monitor as needed  N: ADULT DIET Dysphagia - Minced & Moist        Discussed:  Pt's condition, Imaging findings, Lab findings, Assessment, Care Plan, and D/C Planning discussed with: Patient, RN, and Care Manager    Prophylaxis:  Lovenox SQ    Anticipated discharge disposition:  LTC    HR DC Barriers: Currently not medically stable for discharge and May need placement      Total time: 40 minutes **I personally saw and examined the patient during this time period**      Date of service:    11/5/2022                ___________________________________________________    Admitting Physician: Johanna Hogue MD

## 2022-11-05 NOTE — H&P
History and Physical  NAME: Everton Hickman  MRN: [id number]  YOB: 1931  AGE: 80 y.o.  female  SOCIAL SECURITY NUMBER: [id number]  Primary Care Provider: None  CODE STATUS: Full Code      ASSESSMENT/PLAN:  Bilateral ankle fractures. Appreciate orthopedic surgery consultation. Nonoperative management advised at this time. Request case management consultation. Patient will likely require short-term rehab placement. Request physical therapy consultation. Doubt however that patient will be able to progress with physical therapy at this time with bilateral ankle fractures. Pain management with hydrocodone/acetaminophen 5/325 p.o. every 4 hours as needed. Right patellar fracture. Nonoperative management. Acquired hypothyroidism. Continue levothyroxine 56 mcg p.o. daily. Dysphagia. Soft mechanical diet. History of Presenting Illness:   Everton Hickman is a 80 y.o. female who is mentally astute, and lives by herself in senior living. Patient states that she has been using a walker since December 2021 when she sprained both of her ankles. She states that she was advised by her orthopedic surgeon that her ankles would be weak due to the sprains as well as due to her age. Patient states that prior to that, she was experiencing falls where her ankles will give out. Today, patient did experience a fall at home while she was walking from the living room to the kitchen, using her walker. She states that she did not have any dizziness/lightheadedness, chest pain, palpitations, diaphoresis, visual changes, shortness of breath prior to the fall. She states that while she is not quite sure if both of her ankles twisted, she believes that both of them became weak and gave out. She states that usually, if one of her ankles give out, she uses the contralateral ankle to steady herself.   Patient states that she landed on her knees, and then experienced tremendous right knee pain, followed by bilateral ankle pain. Review of Systems:  A comprehensive review of systems was negative except for that written in the History of Present Illness and the following:  Patient states that she only eats soft meats like chicken and fish. She avoids beef, as it is difficult for her to swallow. She states that she was advised by her doctors that her pharyngeal muscles have weakened due to age, but that the risks of surgery outweigh the benefits at this point. Patient states that while no specific diet was prescribed for her, she generally eats soft foods including boiled eggs. She also is not able to swallow her pills. She takes them with applesauce. Past Medical History:   Diagnosis Date    Anxiety disorder     Colitis     Diabetes ([address]     Dysphagia     generally eats soft foods including soft meats like chicken and fish, but not beef. Patient was told that the risks of surgery outweigh the benefits at this point. She also is not able to swallow her pills. She takes them with applesauce. Hearing reduced     Hypertension     Osteopenia     Sprained ankle     Vision decreased         PAST SURGICAL HISTORY:   Past Surgical History:   Procedure Laterality Date    HX DILATION AND CURETTAGE         Prior to Admission medications    Medication Sig Authorizing Provider   levothyroxine (SYNTHROID) 112 mcg tablet Take 56 mcg by mouth Daily (before breakfast). Provider, Historical   cyanocobalamin 1,000 mcg tablet Take 1,000 mcg by mouth daily. Provider, Historical   cholecalciferol (Vitamin D3) 25 mcg (1,000 unit) cap Take  by mouth daily. Provider, Historical   amLODIPine (NORVASC) 5 mg tablet Take 5 mg by mouth daily. Provider, Historical   losartan (COZAAR) 100 mg tablet Take 100 mg by mouth nightly. Nhung Varela MD   METFORMIN HCL (METFORMIN PO) Take 1,000 mg by mouth at bedtime as directed for dose pack.  Nhung Varela MD   METOPROLOL SUCCINATE PO Take 50 mg by mouth daily. Nhung Varela MD   esomeprazole (NEXIUM) 40 mg capsule Take 20 mg by mouth two (2) times a day. Nhung Varela MD   furosemide (LASIX) 20 mg tablet Take  by mouth daily. Nhung Varela MD   aspirin delayed-release (ECOTRIN LOW STRENGTH) 81 mg tablet Take  by mouth daily. Nhung Varela MD       Allergies   Allergen Reactions    Macrobid [Nitrofurantoin Monohyd/M-Cryst] Anaphylaxis    Pcn [Penicillins] Hives        Family History   Problem Relation Age of Onset    Migraines Mother     Heart Disease Mother     Heart Disease Father     Cancer Sister     Hypertension Sister         Social History     Tobacco Use    Smoking status: Former     Packs/day: 0.20     Years: 10.00     Pack years: 2.00     Types: Cigarettes     Quit date:      Years since quittin.8    Smokeless tobacco: Never   Substance Use Topics    Alcohol use: Not Currently     Comment: previously drank socially    Drug use: Never       Physical exam  Patient Vitals for the past 24 hrs:   BP Temp Pulse Resp SpO2 Oxygen therapy   22 0100 (!) 146/70 98.4 °F (36.9 °C) 70 18 94 % Room air   22 2330 126/66 -- -- -- 94 % Room air   22 2259 139/65 -- -- -- 95 % Room air   22 2229 (!) 151/86 -- -- -- 95 % Room air   22 215 (!) 143/70 -- -- -- 96 % Room air   22 213 (!) 163/82 -- -- -- 96 % Room air   22 (!) 152/60 -- -- -- 97 % Room air   22 (!) 155/60 -- -- -- 96 % Room air   22 193 (!) 140/57 -- -- -- 95 % Room air   22 -- -- -- -- 96 % Room air   22 (!) 166/63 98 °F (36.7 °C) 64 16 96 % Room air     Estimated body mass index is 25.69 kg/m² as calculated from the following:    Height as of this encounter: 5' 3\" (1.6 m). Weight as of this encounter: 65.8 kg (145 lb). General: In no acute distress. Well developed, well nourished. Head: Normocephalic, atraumatic. Eyes: Anicteric sclera. PERRL. Extraocular muscles intact.   ENT: External ears and nose appear normal.  Oral mucosa moist.  Neck: Supple. No jugular venous distention. Heart: Regular rate and rhythm. No murmurs appreciated. Chest: Symmetrical excursion. Clear to auscultation bilaterally. Abdomen: Soft, nontender. No abnormal distention. Bowel sounds are present throughout. Extremities: Bilateral legs in splint from distal foot to distal thighs. No edema, no cyanosis. Feet are warm to touch. Neurological: Patient is able to wiggle toes. Alert, oriented X3. Skin: No jaundice. No rashes. Recent Results (from the past 24 hour(s))   CBC WITH AUTOMATED DIFF    Collection Time: 11/04/22  9:07 PM   Result Value Ref Range    WBC 10.4 3.6 - 11.0 K/uL    RBC 3.58 (L) 3.80 - 5.20 M/uL    HGB 11.2 (L) 11.5 - 16.0 g/dL    HCT 33.5 (L) 35.0 - 47.0 %    MCV 93.6 80.0 - 99.0 FL    MCH 31.3 26.0 - 34.0 PG    MCHC 33.4 30.0 - 36.5 g/dL    RDW 13.1 11.5 - 14.5 %    PLATELET 271 180 - 095 K/uL    MPV 9.8 8.9 - 12.9 FL    NRBC 0.0 0  WBC    ABSOLUTE NRBC 0.00 0.00 - 0.01 K/uL    NEUTROPHILS 72 32 - 75 %    LYMPHOCYTES 20 12 - 49 %    MONOCYTES 6 5 - 13 %    EOSINOPHILS 1 0 - 7 %    BASOPHILS 1 0 - 1 %    IMMATURE GRANULOCYTES 0 0.0 - 0.5 %    ABS. NEUTROPHILS 7.4 1.8 - 8.0 K/UL    ABS. LYMPHOCYTES 2.1 0.8 - 3.5 K/UL    ABS. MONOCYTES 0.7 0.0 - 1.0 K/UL    ABS. EOSINOPHILS 0.1 0.0 - 0.4 K/UL    ABS. BASOPHILS 0.1 0.0 - 0.1 K/UL    ABS. IMM.  GRANS. 0.0 0.00 - 0.04 K/UL    DF AUTOMATED     METABOLIC PANEL, COMPREHENSIVE    Collection Time: 11/04/22  9:07 PM   Result Value Ref Range    Sodium 137 136 - 145 mmol/L    Potassium 4.0 3.5 - 5.1 mmol/L    Chloride 102 97 - 108 mmol/L    CO2 28 21 - 32 mmol/L    Anion gap 7 5 - 15 mmol/L    Glucose 152 (H) 65 - 100 mg/dL    BUN 16 6 - 20 MG/DL    Creatinine 0.71 0.55 - 1.02 MG/DL    BUN/Creatinine ratio 23 (H) 12 - 20      eGFR >60 >60 ml/min/1.73m2    Calcium 9.4 8.5 - 10.1 MG/DL    Bilirubin, total 0.4 0.2 - 1.0 MG/DL    ALT (SGPT) 28 12 - 78 U/L    AST (SGOT) 25 15 - 37 U/L    Alk. phosphatase 72 45 - 117 U/L    Protein, total 7.7 6.4 - 8.2 g/dL    Albumin 3.9 3.5 - 5.0 g/dL    Globulin 3.8 2.0 - 4.0 g/dL    A-G Ratio 1.0 (L) 1.1 - 2.2     SAMPLES BEING HELD    Collection Time: 11/04/22  9:07 PM   Result Value Ref Range    SAMPLES BEING HELD  1SST, 1RED, 1BLU, 1DK GRN     COMMENT        Add-on orders for these samples will be processed based on acceptable specimen integrity and analyte stability, which may vary by analyte. XR Results (maximum last 3): Results from Hospital Encounter encounter on 11/04/22    XR FEMUR LT 2 V    Narrative  EXAM: XR FEMUR LT 2 V    INDICATION: LLE fractures r/o occult hip/femur fx. .    COMPARISON: None. FINDINGS: Two views of the left femur demonstrate no fracture or other acute  osseous, articular or soft tissue abnormality. Impression  No acute abnormality. XR FEMUR RT 2 VS    Narrative  EXAM: XR FEMUR RT 2 VS    INDICATION: RLE fractures r/o occult hip/femur fx. .    COMPARISON: None. FINDINGS: Two views of the right femur demonstrate no fracture or other acute  osseous, articular or soft tissue abnormality. Impression  No acute abnormality. XR KNEE RT 3 V    Narrative  EXAM: XR KNEE RT 3 V    INDICATION: Trauma. COMPARISON: None. FINDINGS: Three views of the right knee demonstrate severe diffuse osteopenia. There is a transverse fracture of the patella at the junction of the middle and  distal thirds. No other fracture is demonstrated. No substantial knee arthrosis  is shown. No knee effusion is demonstrated. A mildly thickened appearance of the  distal patellar tendon shadow is shown. Impression  Nondisplaced transverse fracture of patella. CT Results (maximum last 3): Results from [place] encounter on 11/04/22    CT LOW EXT LT WO CONT (Preliminary)  This result has not been signed. Information might be incomplete.     Narrative  PRELIMINARY REPORT    Avulsion fracture of the inferior pole of the right patella. Nondisplaced  fracture of the proximal left tibial diaphysis. Preliminary report was provided by Dr. Jason Pretty, the on-call radiologist, at the 23 [id number]    Final report to follow. Left ankle x-ray of 11/4/2022:  EXAM: XR ANKLE LT MIN 3 V     INDICATION: fall, ankle pain. COMPARISON: Remote radiographs 7/12/2019. FINDINGS: Three views of the left ankle demonstrate a nondisplaced oblique  fracture of the distal tibial diametaphysis and metaphysis, as well as a  nondisplaced fracture of the distal fibular diametaphysis. No dislocation or  other fracture is shown. There is the bones are severely osteopenic. Extensive  atherosclerotic calcifications are noted. IMPRESSION  Nondisplaced fractures of distal tibial and fibular diametaphyses  and distal tibial metaphysis      Right ankle x-ray of 11/4/2022:  EXAM: XR ANKLE RT MIN 3 V     INDICATION: fall, ankle pain. COMPARISON: 3/11/2010. FINDINGS: Three views of the right ankle. The bones are osteopenic. There is  chronic posttraumatic deformity of the distal fibular shaft. There is an acute  nondisplaced fracture through the base of the medial malleolus. There is an  acute nondisplaced fracture through the lateral malleolus. These are best seen  on the oblique view. The ankle mortise remains intact. The talar dome is intact. There is soft tissue swelling surrounding the ankle. Atherosclerotic  ossifications are present. IMPRESSION  Acute nondisplaced fractures of the medial and lateral malleoli. .      Signature:  Margarette Nelson DO

## 2022-11-05 NOTE — PROGRESS NOTES
11/5/22  9:34 AM    Care Management Initial Evaluation:    CM reviewed chart and met with patient in the room to complete the initial evaluation. Confirmed charted address. Patient lives in [place] [address] at [facility] in [place] [employer]. She will likely need rehab at [place]- will need PT/OT evaluations. Reason for Admission:    1. Closed fracture of left ankle, initial encounter    2. Closed fracture of right ankle, initial encounter    3. Closed fracture of proximal end of left tibia, unspecified fracture morphology, initial encounter    4. Closed nondisplaced fracture of right patella, unspecified fracture morphology, initial encounter                         RUR Score:   Observation status    Medicare Outpatient Observation Notice (MOON)/ [place] Outpatient Observation Notice (Wendi Ritchie) provided to patient/representative with verbal explanation of the notice. Time allotted for questions regarding the notice. Patient /representative provided a completed copy of the MOON/VOON notice. Copy placed on bedside chart. Payor: [employer]                  Plan for utilizing home health:   No current home health history, has had in the past       PCP: First and Last name:  Dr. Donnell Kelsey     Name of Practice: [facility]   Are you a current patient: Yes/No: Yes   Approximate date of last visit: June 2022   Can you participate in a virtual visit with your PCP:                     Current Advanced Directive/Advance Care Plan: Full Code  Has ACP documents- son and daughter are [address] Extension Decision Maker:   Click here to complete [address] including selection of the Healthcare Decision Maker Relationship (ie \"Primary\")  Fish Tello: Daughter- [phone]                        Transition of Care Plan:                    Patient resides alone in a first floor apartment- independent living at the [facility]. She is normally independent with all ADL's with the use of a rollator.  She has a RW, Rollator, canes and her home is handicapped equipped. She no longer drives and her children assist with getting groceries and taking her to appointments. 1). Patient admitted for emergent care  2). Ortho consulted- NWB on both legs  3). PT/OT consulted to determine level of care  4). Will likely need at stretcher at dc  5). CM following for dc needs    [facility] Management Interventions  PCP Verified by CM: Yes (June 2022)  Mode of Transport at Discharge:  Other (see comment) (TBD- likely a stretcher)  Transition of Care Consult (CM Consult): Discharge Planning  Physical Therapy Consult: Yes  Occupational Therapy Consult: Yes  Speech Therapy Consult: No  Support Systems: Child(amado)  Confirm Follow Up Transport: Family  Discharge Location  Patient Expects to be Discharged to[de-identified] Skilled nursing facility

## 2022-11-05 NOTE — PROGRESS NOTES
Received patient from er nurse both legs wrapped in partial cast. Patient refused Narco for pain and accepted regular tylenol but advised if the pain is no relieved she should notify the nurse for available pain med. She reported history of murmur.

## 2022-11-05 NOTE — CONSULTS
ORTHOPEDIC CONSULT    Subjective:     Date of Consultation:  November 4, 2022    Referring Physician:  Dr. Minnette Hodgkin is a 80 y.o.  female who is being seen s/p fall this evening. She was using her walker and had weakness in both of her legs and fell onto her knees and ankles. Denies LOC, landed on stomach and log-rolled onto her back. Called for help and brought in by EMS. Workup has revealed multiple fractures including Right bimalleolar fracture, right transverse patella fracture, left proximal tibia fracture and left distal tibia and fibula fractures. No CP/SOB, N/V. AAOx3. There are no problems to display for this patient. Family History   Problem Relation Age of Onset    Headache Mother     Migraines Mother     Heart Disease Mother     Heart Disease Father     Cancer Sister     Hypertension Sister       Social History     Tobacco Use    Smoking status: Former    Smokeless tobacco: Former   Substance Use Topics    Alcohol use: Never     Past Medical History:   Diagnosis Date    Anxiety disorder     Colitis     Diabetes ([address]     Diarrhea     Hearing reduced     Hypertension     Muscle weakness     Vision decreased       Past Surgical History:   Procedure Laterality Date    HX DILATION AND CURETTAGE        Prior to Admission medications    Medication Sig Start Date End Date Taking? Authorizing Provider   levothyroxine (SYNTHROID) 112 mcg tablet Take 56 mcg by mouth Daily (before breakfast). Provider, Historical   cyanocobalamin 1,000 mcg tablet Take 1,000 mcg by mouth daily. Provider, Historical   cholecalciferol (Vitamin D3) 25 mcg (1,000 unit) cap Take  by mouth daily. Provider, Historical   amLODIPine (NORVASC) 5 mg tablet Take 5 mg by mouth daily. Provider, Historical   losartan (COZAAR) 100 mg tablet Take 100 mg by mouth nightly. Other, MD Nhung   atorvastatin (LIPITOR) 10 mg tablet Take 10 mg by mouth daily.     Nhung Varela MD   METFORMIN HCL (METFORMIN PO) Take 1,000 mg by mouth at bedtime as directed for dose pack. Nhung Varela MD   METOPROLOL SUCCINATE PO Take 50 mg by mouth daily. Nhung Varela MD   esomeprazole (NEXIUM) 40 mg capsule Take 20 mg by mouth two (2) times a day. Nhung Varela MD   furosemide (LASIX) 20 mg tablet Take  by mouth daily. Nhung Varela MD   aspirin delayed-release (ECOTRIN LOW STRENGTH) 81 mg tablet Take  by mouth daily. Nhung Varela MD   folic acid 269 mcg tablet Take 400 mcg by mouth daily. Nhung Varela MD     No current facility-administered medications for this encounter. Current Outpatient Medications   Medication Sig    levothyroxine (SYNTHROID) 112 mcg tablet Take 56 mcg by mouth Daily (before breakfast). cyanocobalamin 1,000 mcg tablet Take 1,000 mcg by mouth daily. cholecalciferol (Vitamin D3) 25 mcg (1,000 unit) cap Take  by mouth daily. amLODIPine (NORVASC) 5 mg tablet Take 5 mg by mouth daily. losartan (COZAAR) 100 mg tablet Take 100 mg by mouth nightly. atorvastatin (LIPITOR) 10 mg tablet Take 10 mg by mouth daily. METFORMIN HCL (METFORMIN PO) Take 1,000 mg by mouth at bedtime as directed for dose pack. METOPROLOL SUCCINATE PO Take 50 mg by mouth daily. esomeprazole (NEXIUM) 40 mg capsule Take 20 mg by mouth two (2) times a day. furosemide (LASIX) 20 mg tablet Take  by mouth daily. aspirin delayed-release (ECOTRIN LOW STRENGTH) 81 mg tablet Take  by mouth daily. folic acid 702 mcg tablet Take 400 mcg by mouth daily. Allergies   Allergen Reactions    Macrobid [Nitrofurantoin Monohyd/M-Cryst] Anaphylaxis    Pcn [Penicillins] Hives        Review of Systems:  A comprehensive review of systems was negative except for that written in the HPI.     Objective:     Patient Vitals for the past 8 hrs:   BP Temp Pulse Resp SpO2 Height Weight   11/04/22 2136 (!) 163/82 -- -- -- 96 % -- --   11/04/22 2051 (!) 152/60 -- -- -- 97 % -- --   11/04/22 2012 (!) 155/60 -- -- -- 96 % -- -- 22 (!) 140/57 -- -- -- 95 % -- --   22 -- -- -- -- 96 % -- --   22 (!) 166/63 98 °F (36.7 °C) 64 16 96 % 5' 3\" (1.6 m) 65.8 kg (145 lb)     Temp (24hrs), Av °F (36.7 °C), Min:98 °F (36.7 °C), Max:98 °F (36.7 °C)    Physical Exam  Constitutional:       Appearance:  well-developed, well nourished  HENT:      Head: Normocephalic and atraumatic. Nose: Nose normal.       Mouth: Mucous membranes are moist.   Eyes:      Extraocular Movements:  intact. Conjunctiva/sclera:  normal.      Pupils:  equal, round, and reactive to light. Cardiovascular:      Rate and Rhythm: Normal rate and regular rhythm. Good peripheral pulses  Pulmonary:      Effort: Pulmonary effort is normal. No respiratory distress. Abdominal:      General: non-distended. Musculoskeletal:         General:   Right leg: Thigh supple non-tender, ecchymosis along anterior patella and right ankle. Small superficial laceration across medial great toe and bleeding controlled with pressure/dressing. + edema of right knee joint and right ankle. Good sensation, limited ROM of knee and ankle due to fractures. Able to wiggle toes. 2+ pre-tibial edema/fluid + DP/PT pulse    Left leg: Thigh supple, non-tender, ecchymosis at anterior proximal tibia and medial and lateral malleolus. Limited knee and ankle ROM due to fractures. Wiggles toes. Good sensation, 2+ pre-tibial edema/fluid. +Dp/PT pulse, cap refill < 3 secs. Normal range of motion and neck supple. Skin:     General: Skin is warm. Neurological:      General: No focal deficit present. Mental Status: She is alert and oriented to person, place, and time.       Comments: No gross motor or sensory deficits       Data Review   Recent Results (from the past 24 hour(s))   CBC WITH AUTOMATED DIFF    Collection Time: 22  9:07 PM   Result Value Ref Range    WBC 10.4 3.6 - 11.0 K/uL    RBC 3.58 (L) 3.80 - 5.20 M/uL    HGB 11.2 (L) 11.5 - 16.0 g/dL    HCT 33.5 (L) 35.0 - 47.0 %    MCV 93.6 80.0 - 99.0 FL    MCH 31.3 26.0 - 34.0 PG    MCHC 33.4 30.0 - 36.5 g/dL    RDW 13.1 11.5 - 14.5 %    PLATELET 078 701 - 862 K/uL    MPV 9.8 8.9 - 12.9 FL    NRBC 0.0 0  WBC    ABSOLUTE NRBC 0.00 0.00 - 0.01 K/uL    NEUTROPHILS 72 32 - 75 %    LYMPHOCYTES 20 12 - 49 %    MONOCYTES 6 5 - 13 %    EOSINOPHILS 1 0 - 7 %    BASOPHILS 1 0 - 1 %    IMMATURE GRANULOCYTES 0 0.0 - 0.5 %    ABS. NEUTROPHILS 7.4 1.8 - 8.0 K/UL    ABS. LYMPHOCYTES 2.1 0.8 - 3.5 K/UL    ABS. MONOCYTES 0.7 0.0 - 1.0 K/UL    ABS. EOSINOPHILS 0.1 0.0 - 0.4 K/UL    ABS. BASOPHILS 0.1 0.0 - 0.1 K/UL    ABS. IMM. GRANS. 0.0 0.00 - 0.04 K/UL    DF AUTOMATED     METABOLIC PANEL, COMPREHENSIVE    Collection Time: 11/04/22  9:07 PM   Result Value Ref Range    Sodium 137 136 - 145 mmol/L    Potassium 4.0 3.5 - 5.1 mmol/L    Chloride 102 97 - 108 mmol/L    CO2 28 21 - 32 mmol/L    Anion gap 7 5 - 15 mmol/L    Glucose 152 (H) 65 - 100 mg/dL    BUN 16 6 - 20 MG/DL    Creatinine 0.71 0.55 - 1.02 MG/DL    BUN/Creatinine ratio 23 (H) 12 - 20      eGFR >60 >60 ml/min/1.73m2    Calcium 9.4 8.5 - 10.1 MG/DL    Bilirubin, total 0.4 0.2 - 1.0 MG/DL    ALT (SGPT) 28 12 - 78 U/L    AST (SGOT) 25 15 - 37 U/L    Alk. phosphatase 72 45 - 117 U/L    Protein, total 7.7 6.4 - 8.2 g/dL    Albumin 3.9 3.5 - 5.0 g/dL    Globulin 3.8 2.0 - 4.0 g/dL    A-G Ratio 1.0 (L) 1.1 - 2.2     SAMPLES BEING HELD    Collection Time: 11/04/22  9:07 PM   Result Value Ref Range    SAMPLES BEING HELD  1SST, 1RED, 1BLU, 1DK GRN     COMMENT        Add-on orders for these samples will be processed based on acceptable specimen integrity and analyte stability, which may vary by analyte. EXAM: XR FEMUR RT 2 VS     INDICATION: RLE fractures r/o occult hip/femur fx. .     COMPARISON: None. FINDINGS: Two views of the right femur demonstrate no fracture or other acute  osseous, articular or soft tissue abnormality.      IMPRESSION  No acute abnormality. EXAM: XR KNEE RT 3 V     INDICATION: Trauma. COMPARISON: None. FINDINGS: Three views of the right knee demonstrate severe diffuse osteopenia. There is a transverse fracture of the patella at the junction of the middle and  distal thirds. No other fracture is demonstrated. No substantial knee arthrosis  is shown. No knee effusion is demonstrated. A mildly thickened appearance of the  distal patellar tendon shadow is shown. IMPRESSION  Nondisplaced transverse fracture of patella. EXAM: XR ANKLE RT MIN 3 V     INDICATION: fall, ankle pain. COMPARISON: 3/11/2010. FINDINGS: Three views of the right ankle. The bones are osteopenic. There is  chronic posttraumatic deformity of the distal fibular shaft. There is an acute  nondisplaced fracture through the base of the medial malleolus. There is an  acute nondisplaced fracture through the lateral malleolus. These are best seen  on the oblique view. The ankle mortise remains intact. The talar dome is intact. There is soft tissue swelling surrounding the ankle. Atherosclerotic  ossifications are present. IMPRESSION  Acute nondisplaced fractures of the medial and lateral malleoli. Rhenda Sophia EXAM: XR FEMUR LT 2 V     INDICATION: LLE fractures r/o occult hip/femur fx. .     COMPARISON: None. FINDINGS: Two views of the left femur demonstrate no fracture or other acute  osseous, articular or soft tissue abnormality. IMPRESSION  No acute abnormality    EXAM: XR KNEE LT 3 V     INDICATION: Trauma. COMPARISON: Left leg radiographs 4/20/2017. FINDINGS: Three views of the left knee demonstrate severe diffuse osteopenia  with a nondisplaced oblique fracture of the proximal tibial diametaphysis. No  dislocation or other fracture is shown. There is anatomic knee alignment. No  knee effusion is demonstrated. No substantial knee arthrosis is shown. IMPRESSION  Nondisplaced fracture proximal tibial diametaphysis.     EXAM: XR ANKLE LT MIN 3 V     INDICATION: fall, ankle pain. COMPARISON: Remote radiographs 7/12/2019. FINDINGS: Three views of the left ankle demonstrate a nondisplaced oblique  fracture of the distal tibial diametaphysis and metaphysis, as well as a  nondisplaced fracture of the distal fibular diametaphysis. No dislocation or  other fracture is shown. There is the bones are severely osteopenic. Extensive  atherosclerotic calcifications are noted. IMPRESSION  Nondisplaced fractures of distal tibial and fibular diametaphyses  and distal tibial metaphysis        Assessment/Plan:     A:  Right transverse patella fracture  Right medial and lateral malleolus fractures  Left Proximal tibia fracture  Left distal tibia and fibula fractures    P:  Stable from orthopedic standpoint - non-operative treatment at this time. Watch for anemia due to mutliple fractures  additional femur images ordered and completed due to several distracting injuries  CT left LE ordered for further evaluation and surgical planning if needed  Long-leg 3-way splints well padded to bilateral LE, NWB BLE. Admit to hospitalist team for medical management of HTN/DM. DVT prophylaxis: Lovenox  Pain control, rest, ice, and elevate    I have discussed the above findings and plan fo care with Dr. Waggoner Daughters and he agrees.        Gildardo Hammer PA-C  [facility]  [address] [facility]

## 2022-11-05 NOTE — ED NOTES
Bedside and Verbal report given to Eddie Sweet RN (oncoming nurse) by Bernabe Prajapati RN (offgoing nurse). Report included the following information SBAR, ED Summary, and MAR.

## 2022-11-05 NOTE — PROGRESS NOTES
Problem: Mobility Impaired (Adult and Pediatric)  Goal: *Acute Goals and Plan of Care (Insert Text)  Description: FUNCTIONAL STATUS PRIOR TO ADMISSION: Patient was modified independent using a rollator for functional mobility. HOME SUPPORT PRIOR TO ADMISSION: The patient lived alone with daughter in the are to provide assistance if needed. Physical Therapy Goals  Initiated 11/5/2022  1. Patient will move from supine to sit and sit to supine , scoot up and down, and roll side to side in bed with supervision/set-up within 7 day(s). 2.  Patient will transfer from bed to chair and chair to bed via lateral scoot and/or sliding board with minimal assistance/contact guard assist using the least restrictive device within 7 day(s). Outcome: Not Met   PHYSICAL THERAPY EVALUATION  Patient: Everton Hickman (54 y.o. female)  Date: 11/5/2022  Primary Diagnosis: Bilateral ankle fractures [S82.891A, S82.892A]  Closed patellar sleeve fracture of right knee [S82.091A]       Precautions:   Fall      ASSESSMENT  Based on the objective data described below, the patient presents with generalized weakness, BLE NWB status, decreased activity tolerance, and inability to transfer or ambulate not consistent with baseline functional mobility level s/p admission for bilateral ankle fractures and R patellar fracture, L proximal tibial fracture. Patient appearing much younger than her age, fully oriented and without c/o significant pain. Required MOD A x 2 to come to sitting EOB, one person to assist and manage LEs and one for trunk support. Good sitting balance statically and fair to good dynamically. Worked on scooting laterally in the bed but patient unable to advance laterally, could advance an inch or two forward and backward. At baseline, patient is active and lives alone in senior apartments, uses a rollator for mobility.   Patient unable to d/c safely home at this time, needs SNF rehab to maximize strength to work towards indep transfers until able to weight bear on LEs. Current Level of Function Impacting Discharge (mobility/balance): MOD A x 2 bed mob, unable to transfer/scoot without assistance    Functional Outcome Measure: The patient scored Total: 35/100 on the Barthel Index outcome measure which is indicative of being very dependent in basic self-care. Other factors to consider for discharge: lives alone, needs 2 person assist     Patient will benefit from skilled therapy intervention to address the above noted impairments. PLAN :  Recommendations and Planned Interventions: bed mobility training, transfer training, therapeutic exercises, patient and family training/education, and therapeutic activities      Frequency/Duration: Patient will be followed by physical therapy:  4 times a week to address goals. Recommendation for discharge: (in order for the patient to meet his/her long term goals)  Therapy up to 5 days/week in SNF setting    This discharge recommendation:  Has been made in collaboration with the attending provider and/or case management    IF patient discharges home will need the following DME: bedside commode, hospital bed, mechanical lift, and wheelchair         SUBJECTIVE:   Patient stated When I did this I decided to do it fully (break bones).     OBJECTIVE DATA SUMMARY:   HISTORY:    Past Medical History:   Diagnosis Date    Anxiety disorder     Colitis     Di[address] [address]     Dysphagia     generally eats soft foods including soft meats like chicken and fish, but not beef. Patient was told that the risks of surgery outweigh the benefits at this point. She also is not able to swallow her pills. She takes them with applesauce.     Hearing reduced     Hypertension     Osteopenia     Sprained ankle     Vision decreased      Past Surgical History:   Procedure Laterality Date    HX DILATION AND CURETTAGE         Personal factors and/or comorbidities impacting plan of care:     Home Situation  Home Environment: Apartment  # Steps to Enter: 0  One/Two Story Residence: One story  Living Alone: Yes  Support Systems: Child(amado)  Patient Expects to be Discharged to[de-identified] Skilled nursing facility  Current DME Used/Available at Home: Zac Pradeep, rollator, Shower chair, Grab bars  Tub or Shower Type: Shower    EXAMINATION/PRESENTATION/DECISION MAKING:   Critical Behavior:  Neurologic State: Alert  Orientation Level: Oriented X4  Cognition: Appropriate for age attention/concentration, Follows commands     Hearing: Auditory  Auditory Impairment: None    Range Of Motion:  AROM: Generally decreased, functional (ankles and knees in cast)                       Strength:    Strength: Generally decreased, functional (ankles and knees in cast)                    Tone & Sensation:   Tone: Normal              Sensation: Intact               Coordination:  Coordination: Generally decreased, functional  Vision:      Functional Mobility:  Bed Mobility:     Supine to Sit: Moderate assistance;Assist x2  Sit to Supine: Moderate assistance;Assist x2  Scooting: Maximum assistance;Assist x2  Transfers:                 Lateral Transfers:  (unable)           Balance:   Sitting: Impaired  Sitting - Static: Good (unsupported)  Sitting - Dynamic: Fair (occasional)  Ambulation/Gait Training:                                                         Functional Measure:  Barthel Index:    Bathin  Bladder: 5  Bowels: 10  Groomin  Dressin  Feeding: 10  Mobility: 0  Stairs: 0  Toilet Use: 0  Transfer (Bed to Chair and Back): 0  Total: 35/100       The Barthel ADL Index: Guidelines  1. The index should be used as a record of what a patient does, not as a record of what a patient could do. 2. The main aim is to establish degree of independence from any help, physical or verbal, however minor and for whatever reason. 3. The need for supervision renders the patient not independent.   4. A patient's performance should be established using the best available evidence. Asking the patient, friends/relatives and nurses are the usual sources, but direct observation and common sense are also important. However direct testing is not needed. 5. Usually the patient's performance over the preceding 24-48 hours is important, but occasionally longer periods will be relevant. 6. Middle categories imply that the patient supplies over 50 per cent of the effort. 7. Use of aids to be independent is allowed. Score Interpretation (from [address] [facility])    Independent   60-79 Minimally independent   40-59 Partially dependent   20-39 Very dependent   <20 Totally dependent     -Rhonda Arnold., Barthel, D.W. (1965). Functional evaluation: the Barthel Index. [address] ([address] (1997). The Barthel activities of daily living index: self-reporting versus actual performance in the old (> or = 75 years). Journal of [address] [place] [address] 45(7), [facility], MONALISA, Abdiel Spicer., Fuentes Lainez. (1999). Measuring the change in disability after inpatient rehabilitation; comparison of the responsiveness of the Barthel Index and Functional [place] Measure. Journal of Neurology, Neurosurgery, and Psychiatry, 66(4), 416-744. Sal Boyd, N.J.A, ISMAEL Gillette, & Nadira Land, M.A. (2004) Assessment of post-stroke quality of life in cost-effectiveness studies: The usefulness of the Barthel Index and the EuroQoL-5D.  Quality of Life Research, 15, 209-80        Physical Therapy Evaluation Charge Determination   History Examination Presentation Decision-Making   HIGH Complexity :3+ comorbidities / personal factors will impact the outcome/ POC  MEDIUM Complexity : 3 Standardized tests and measures addressing body structure, function, activity limitation and / or participation in recreation  MEDIUM Complexity : Evolving with changing characteristics  Other outcome measures Barthel 35  HIGH       Based on the above components, the patient evaluation is determined to be of the following complexity level: MEDIUM    Pain Ratin-5/10 in B LEs    Activity Tolerance:   Good    After treatment patient left in no apparent distress:   Call bell within reach, Caregiver / family present, and bed in chair position    COMMUNICATION/EDUCATION:   The patients plan of care was discussed with: Occupational therapist, Registered nurse, and Case management. Fall prevention education was provided and the patient/caregiver indicated understanding. and Patient/family agree to work toward stated goals and plan of care.     Thank you for this referral.  Caro Beck, PT   Time Calculation: 17 mins

## 2022-11-05 NOTE — PROGRESS NOTES
Problem: Self Care Deficits Care Plan (Adult)  Goal: *Acute Goals and Plan of Care (Insert Text)  Description: FUNCTIONAL STATUS PRIOR TO ADMISSION: Patient was modified independent using a rollator for functional mobility. HOME SUPPORT: The patient lived alone with family to provide assistance. Occupational Therapy Goals  Initiated 11/5/2022  1. Patient will perform bathing with minimal assistance/contact guard assist within 7 day(s). 2.  Patient will perform lower body dressing long sitting in bed or seated on EOB, rolling side to side with minimal assistance/contact guard assist within 7 day(s). 4.  Patient will perform toilet transfers (lateral using sliding board to [facility])   with moderate assistance  within 7 day(s). 5.  Patient will perform all aspects of toileting with moderate assistance  within 7 day(s). 6.  Patient will participate in upper extremity therapeutic exercise/activities with supervision/set-up for 10 minutes within 7 day(s). 7.  Patient will utilize energy conservation techniques during functional activities with verbal cues within 7 day(s). Outcome: Progressing Towards Goal   OCCUPATIONAL THERAPY EVALUATION  Patient: Maggie Aguero (89 y.o. female)  Date: 11/5/2022  Primary Diagnosis: Bilateral ankle fractures [S82.891A, S82.892A]  Closed patellar sleeve fracture of right knee [S82.091A]       Precautions: NWB B LEs,  Fall    ASSESSMENT  Based on the objective data described below, the patient presents with NWB B LEs, impaired mobility, generalized weakness and decrease independence with self-care tasks. Pt very pleasant and motivated to participate. Supportive family present in the room. Pt achieved sitting on EOB with mod assist x 2. Good sit balance, however, difficulty scooting higher in bed. Pt lives alone and will need further therapy at d/c.   Will con't to follow and address listed goals    Current Level of Function Impacting Discharge (ADLs/self-care): upto max assist    Other factors to consider for discharge: lives alone, NWB B LEs     Patient will benefit from skilled therapy intervention to address the above noted impairments. PLAN :  Recommendations and Planned Interventions: self care training, functional mobility training, therapeutic exercise, therapeutic activities, endurance activities, patient education, and home safety training    Frequency/Duration: Patient will be followed by occupational therapy 4 times a week to address goals. Recommendation for discharge: (in order for the patient to meet his/her long term goals)  Therapy up to 5 days/week in SNF setting    This discharge recommendation:  Has been made in collaboration with the attending provider and/or case management    IF patient discharges home will need the following DME: w/c with elevating leg rests and swing away arm rests, DABSC       SUBJECTIVE:   Patient stated I fell in between my dining room and kitchen.     OBJECTIVE DATA SUMMARY:   HISTORY:   Past Medical History:   Diagnosis Date    Anxiety disorder     Colitis     Diabetes ([address]     Dysphagia     generally eats soft foods including soft meats like chicken and fish, but not beef. Patient was told that the risks of surgery outweigh the benefits at this point. She also is not able to swallow her pills. She takes them with applesauce.     Hearing reduced     Hypertension     Osteopenia     Sprained ankle     Vision decreased      Past Surgical History:   Procedure Laterality Date    HX DILATION AND CURETTAGE         Expanded or extensive additional review of patient history:     Home Situation  Home Environment: Apartment  # Steps to Enter: 0  One/Two Story Residence: One story  Living Alone: Yes  Support Systems: Child(amado)  Patient Expects to be Discharged to[de-identified] Skilled nursing facility  Current DME Used/Available at Home: Walker, rollator, Shower chair, Grab bars  Tub or Shower Type: Shower        EXAMINATION OF PERFORMANCE DEFICITS:  Cognitive/Behavioral Status:  Neurologic State: Alert  Orientation Level: Oriented X4  Cognition: Appropriate decision making        Safety/Judgement: Awareness of environment    Skin:     Edema:     Hearing: Auditory  Auditory Impairment: None    Vision/Perceptual:                                Corrective Lenses: Glasses    Range of Motion:    AROM: Generally decreased, functional (ankles and knees in cast)                         Strength:    Strength: Generally decreased, functional (ankles and knees in cast)                Coordination:  Coordination: Generally decreased, functional  Fine Motor Skills-Upper: Left Intact; Right Intact    Gross Motor Skills-Upper: Left Intact; Right Intact    Tone & Sensation:    Tone: Normal  Sensation: Intact                      Balance:  Sitting: Impaired  Sitting - Static: Good (unsupported)  Sitting - Dynamic: Fair (occasional)    Functional Mobility and Transfers for ADLs:  Bed Mobility:  Supine to Sit: Moderate assistance;Assist x2  Sit to Supine: Moderate assistance;Assist x2  Scooting: Maximum assistance;Assist x2    Transfers:       ADL Assessment:  Feeding: Independent    Oral Facial Hygiene/Grooming: Setup    Bathing: Moderate assistance    Type of Bath: Chlorhexidine (CHG)    Upper Body Dressing: Setup    Lower Body Dressing: Maximum assistance    Toileting: Total assistance                  ADL Intervention and task modifications:                 Type of Bath: Chlorhexidine (CHG)                        Cognitive Retraining  Safety/Judgement: Awareness of environment      Pain Rating:  Controlled pain    Activity Tolerance:   Good    After treatment patient left in no apparent distress:    Supine in bed    COMMUNICATION/EDUCATION:   The patients plan of care was discussed with: Physical therapist and Registered nurse.      Home safety education was provided and the patient/caregiver indicated understanding., Patient/family have participated as able in goal setting and plan of care. , and Patient/family agree to work toward stated goals and plan of care. This patients plan of care is appropriate for delegation to FLORES.     Thank you for this referral.  Maritza Garcia, OTR/L  Time Calculation: 15 mins

## 2022-11-06 LAB
ABO + RH BLD: NORMAL
BASOPHILS # BLD: 0 K/UL (ref 0–0.1)
BASOPHILS NFR BLD: 0 % (ref 0–1)
BLOOD GROUP ANTIBODIES SERPL: NORMAL
DIFFERENTIAL METHOD BLD: ABNORMAL
EOSINOPHIL # BLD: 0.2 K/UL (ref 0–0.4)
EOSINOPHIL NFR BLD: 2 % (ref 0–7)
ERYTHROCYTE [DISTWIDTH] IN BLOOD BY AUTOMATED COUNT: 13.4 % (ref 11.5–14.5)
GLUCOSE BLD STRIP.AUTO-MCNC: 152 MG/DL (ref 65–117)
GLUCOSE BLD STRIP.AUTO-MCNC: 152 MG/DL (ref 65–117)
GLUCOSE BLD STRIP.AUTO-MCNC: 190 MG/DL (ref 65–117)
GLUCOSE BLD STRIP.AUTO-MCNC: 199 MG/DL (ref 65–117)
HCT VFR BLD AUTO: 26.8 % (ref 35–47)
HCT VFR BLD AUTO: 27.2 % (ref 35–47)
HCT VFR BLD AUTO: 27.5 % (ref 35–47)
HGB BLD-MCNC: 8.9 G/DL (ref 11.5–16)
HGB BLD-MCNC: 8.9 G/DL (ref 11.5–16)
HGB BLD-MCNC: 9.1 G/DL (ref 11.5–16)
IMM GRANULOCYTES # BLD AUTO: 0.1 K/UL (ref 0–0.04)
IMM GRANULOCYTES NFR BLD AUTO: 1 % (ref 0–0.5)
LYMPHOCYTES # BLD: 2.5 K/UL (ref 0.8–3.5)
LYMPHOCYTES NFR BLD: 28 % (ref 12–49)
MCH RBC QN AUTO: 30.6 PG (ref 26–34)
MCHC RBC AUTO-ENTMCNC: 32.7 G/DL (ref 30–36.5)
MCV RBC AUTO: 93.5 FL (ref 80–99)
MONOCYTES # BLD: 0.8 K/UL (ref 0–1)
MONOCYTES NFR BLD: 9 % (ref 5–13)
NEUTS SEG # BLD: 5.5 K/UL (ref 1.8–8)
NEUTS SEG NFR BLD: 60 % (ref 32–75)
NRBC # BLD: 0 K/UL (ref 0–0.01)
NRBC BLD-RTO: 0 PER 100 WBC
PLATELET # BLD AUTO: 161 K/UL (ref 150–400)
PMV BLD AUTO: 9.9 FL (ref 8.9–12.9)
RBC # BLD AUTO: 2.91 M/UL (ref 3.8–5.2)
SERVICE CMNT-IMP: ABNORMAL
SPECIMEN EXP DATE BLD: NORMAL
WBC # BLD AUTO: 9.2 K/UL (ref 3.6–11)

## 2022-11-06 PROCEDURE — 85025 COMPLETE CBC W/AUTO DIFF WBC: CPT

## 2022-11-06 PROCEDURE — G0378 HOSPITAL OBSERVATION PER HR: HCPCS

## 2022-11-06 PROCEDURE — 36415 COLL VENOUS BLD VENIPUNCTURE: CPT

## 2022-11-06 PROCEDURE — 77030041014

## 2022-11-06 PROCEDURE — 74011000250 HC RX REV CODE- 250: Performed by: HOSPITALIST

## 2022-11-06 PROCEDURE — 96376 TX/PRO/DX INJ SAME DRUG ADON: CPT

## 2022-11-06 PROCEDURE — 85018 HEMOGLOBIN: CPT

## 2022-11-06 PROCEDURE — 74011250637 HC RX REV CODE- 250/637: Performed by: HOSPITALIST

## 2022-11-06 PROCEDURE — 86900 BLOOD TYPING SEROLOGIC ABO: CPT

## 2022-11-06 PROCEDURE — 96372 THER/PROPH/DIAG INJ SC/IM: CPT

## 2022-11-06 PROCEDURE — 74011250636 HC RX REV CODE- 250/636: Performed by: HOSPITALIST

## 2022-11-06 PROCEDURE — 74011250636 HC RX REV CODE- 250/636: Performed by: INTERNAL MEDICINE

## 2022-11-06 PROCEDURE — 74011250637 HC RX REV CODE- 250/637: Performed by: INTERNAL MEDICINE

## 2022-11-06 PROCEDURE — 74011000250 HC RX REV CODE- 250: Performed by: INTERNAL MEDICINE

## 2022-11-06 PROCEDURE — 82962 GLUCOSE BLOOD TEST: CPT

## 2022-11-06 PROCEDURE — 74011250637 HC RX REV CODE- 250/637: Performed by: NURSE PRACTITIONER

## 2022-11-06 RX ORDER — POLYETHYLENE GLYCOL 3350 17 G/17G
17 POWDER, FOR SOLUTION ORAL DAILY
Status: DISCONTINUED | OUTPATIENT
Start: 2022-11-06 | End: 2022-11-08 | Stop reason: HOSPADM

## 2022-11-06 RX ADMIN — CEFTRIAXONE 1 G: 1 INJECTION, POWDER, FOR SOLUTION INTRAMUSCULAR; INTRAVENOUS at 21:25

## 2022-11-06 RX ADMIN — OXYCODONE 2.5 MG: 5 TABLET ORAL at 21:26

## 2022-11-06 RX ADMIN — POLYETHYLENE GLYCOL 3350 17 G: 17 POWDER, FOR SOLUTION ORAL at 10:08

## 2022-11-06 RX ADMIN — SODIUM CHLORIDE, PRESERVATIVE FREE 10 ML: 5 INJECTION INTRAVENOUS at 06:53

## 2022-11-06 RX ADMIN — ACETAMINOPHEN 650 MG: 325 TABLET, FILM COATED ORAL at 12:38

## 2022-11-06 RX ADMIN — OXYCODONE 2.5 MG: 5 TABLET ORAL at 04:25

## 2022-11-06 RX ADMIN — ACETAMINOPHEN 650 MG: 325 TABLET, FILM COATED ORAL at 06:33

## 2022-11-06 RX ADMIN — PANTOPRAZOLE SODIUM 40 MG: 40 TABLET, DELAYED RELEASE ORAL at 06:34

## 2022-11-06 RX ADMIN — ASPIRIN 81 MG: 81 TABLET, COATED ORAL at 10:07

## 2022-11-06 RX ADMIN — SODIUM CHLORIDE, PRESERVATIVE FREE 10 ML: 5 INJECTION INTRAVENOUS at 21:27

## 2022-11-06 RX ADMIN — LEVOTHYROXINE SODIUM 56 MCG: 0.11 TABLET ORAL at 06:33

## 2022-11-06 RX ADMIN — ACETAMINOPHEN 650 MG: 325 TABLET, FILM COATED ORAL at 18:10

## 2022-11-06 RX ADMIN — CYANOCOBALAMIN TAB 500 MCG 1000 MCG: 500 TAB at 10:08

## 2022-11-06 RX ADMIN — ENOXAPARIN SODIUM 40 MG: 100 INJECTION SUBCUTANEOUS at 10:11

## 2022-11-06 RX ADMIN — SODIUM CHLORIDE, PRESERVATIVE FREE 10 ML: 5 INJECTION INTRAVENOUS at 18:11

## 2022-11-06 RX ADMIN — Medication 1000 UNITS: at 10:08

## 2022-11-06 RX ADMIN — AMLODIPINE BESYLATE 5 MG: 5 TABLET ORAL at 06:55

## 2022-11-06 RX ADMIN — FUROSEMIDE 20 MG: 20 TABLET ORAL at 10:08

## 2022-11-06 RX ADMIN — METOPROLOL SUCCINATE 50 MG: 50 TABLET, EXTENDED RELEASE ORAL at 10:08

## 2022-11-06 NOTE — PROGRESS NOTES
Orthopedic NP Progress Note      November 6, 2022 10:23 AM     Slim Feliciano    Attending Physician: Treatment Team: Attending Provider: Jorge Alberto Pyle MD; Consulting Provider: Flora Gallegos MD; Physician Assistant: Smitha Gaspar; Consulting Provider: Rocio Gupta DO; Utilization Review: Hope Velazquez; Care Manager: Harry Callahan; Primary Nurse: Kell Jordan RN     Vital Signs:    Patient Vitals for the past 8 hrs:   BP Temp Pulse Resp SpO2   11/06/22 0643 (!) 163/68 98.2 °F (36.8 °C) 82 18 92 %   11/06/22 0427 (!) 167/67 98.2 °F (36.8 °C) 85 18 92 %   11/06/22 0331 (!) 168/54 98.6 °F (37 °C) 79 16 90 %   11/05/22 2315 (!) 161/63 98.4 °F (36.9 °C) 89 16 90 %       BMI (calculated): 25.7 (11/04/22 1904)    Intake/Output:  11/05 1901 - 11/06 0700  In: 310 [P.O.:300; I.V.:10]  Out: 700 [Urine:700]  11/04 0701 - 11/05 1900  In: 350 [P.O.:350]  Out: 400 [Urine:400]    Pain Control:   Pain Assessment  Pain Scale 1: Numeric (0 - 10)  Pain Intensity 1: 7  Pain Onset 1: 11/4/22  Pain Location 1: Leg  Pain Orientation 1: Left, Right  Pain Description 1: Aching  Pain Intervention(s) 1: Medication (see MAR)    LAB:    Recent Labs     11/06/22  0347   HCT 27.2*   HGB 8.9*       Lab Results   Component Value Date/Time    Sodium 137 11/04/2022 09:07 PM    Potassium 4.0 11/04/2022 09:07 PM    Chloride 102 11/04/2022 09:07 PM    CO2 28 11/04/2022 09:07 PM    Glucose 152 (H) 11/04/2022 09:07 PM    BUN 16 11/04/2022 09:07 PM    Creatinine 0.71 11/04/2022 09:07 PM    Calcium 9.4 11/04/2022 09:07 PM       Subjective:  Slim Feliciano is a 80 y.o. female s/p GLF yesterday resulting in bilat ankle fractures with R patella fx and L proximal tibia fracture. Pt reports pain was better with PO tylenol and she did take the low dose oxy at bed time and pain is well managed with a good night sleep    . Tolerating diet. Objective: General: alert, cooperative, no distress. Neuro/Vascular: CNS Intact.   Sensation stable. Brisk cap refill, 2+ pulses UE/LE  Musculoskeletal:  +ROM UE with splints above the knee to bilat LE, able to ROM toes with good cap refill, sensation intact, splints removed - LLE with mild edema and ecchymosis at the proximal tibia and mild ankle edema noted, NVI   RLE - mild edema to ankle with min edema to patella, NVI   Skin: warm and dry, R great toe with wound      Willett - n  Drain -n       PT/OT:   Gait:                      Assessment:    s/p     Active Problems:    Bilateral ankle fractures (11/4/2022)      Closed patellar sleeve fracture of right knee (11/4/2022)       Plan:   Right transverse patella fracture  Right medial and lateral malleolus fractures  Left Proximal tibia fracture  Left distal tibia and fibula fractures         - Stable from orthopedic standpoint - non-operative treatment at this time  - CT LLE completed, Dr. Trice Huffman to review and plan accordingly if able to truly treat proximal tibia fracture nonop, converted pt over to [employer] and she will remain NWB  - removed splints and placed pt in Cam Walker boots bilat, NVI pre and post placement,  NWB BLE, ice and elevate with pain management as per orders  - DVT prophylaxis: Lovenox as per medicine        Plan as per Dr. Trice Huffman, pt will need placement given she is not able to ambulate at this time.      Signed By: Kristy Chiu NP    Orthopedic Nurse Practitioner

## 2022-11-06 NOTE — PROGRESS NOTES
Case discussed and x-rays reviewed. Plan for non-op treatment of the knee and ankles. Will transition to bilateral CAM walkers today. Transfers only and will repeat x-rays of the knee and ankles on Monday, if stable then snf placement and follop-up in 2 weeks.

## 2022-11-06 NOTE — PROGRESS NOTES
CM CONSULT NOTED:   12:56 PM-  CM met with pt and pt's dtr- they are requesting referrals be sent to [address] and [address] [facility] completed- referrals sent. Attending aware pt will need a COVID-19 rapid on day of discharge. Pt will also need [facility] transportation at time of discharge. Auth to be started once facility accepts- pt's family unsure of first preference, will decided if/when accepted. CM will continue to follow and assist as needed. 11:12 AM- CM noted no OT Order- spoke with OT placed verbal order. 10:29 AM- Pt remains on Ortho- awaiting placement. CM spoke with pt regarding therapy- pt agreeable to go to rehab but wishes for CM to speak with her daughter- states she is in [employer] and will be up here shortly.      Jae Garcia, MSW, [address]

## 2022-11-06 NOTE — PROGRESS NOTES
Patient alert and blood pressure have been high at first check she was complaining of pain oxycodone was administered and she went to sleep rechecked after one hour and it was still elevated at this time is 1/10. Rechecked again and patient stated that she gets her blood pressure meds twice a day advised to get a list from the daughter so meds can be adjusted as she stated. At this time I will administer her 0900 am dose of Norvasc. And continue to monitor.

## 2022-11-06 NOTE — PROGRESS NOTES
[facility]  [address], [facility] [address]  [phone]         Hospitalist Progress Note        NAME:  Aileen Goins   :  1931   MRN:  [id number]    Date/Time:  2022     Patient PCP:  None    Emergency Contact:    Extended Emergency Contact Information  Primary Emergency Contact: [address] Phone: [phone]  Mobile Phone: [phone]  Relation: Daughter      Code: Full Code     Isolation Precautions: There are currently no Active Isolations        Subjective:     REASON FOR VISIT:  Recheck Ankle and knee Fxs     HPI & INTERVAL HISTORY:     Ms. Sandy Dykes is a 80 y.o. female with history that includes DM 2, dysphagia, HTN, hypothyroid and history of ankle sprains using walker since 2021 presents after GLF at home felt that ankles gave out on her causing to have GLF at home. Fall resulted in bilateral ankle and right knee pain. Right knee and ankles found to be fractured. : No events overnight. C/O continuous LLE pain moderate and RLE pain mild due to fractures worsens with movment on the left. Pain meds help. Will get Boots today per d/w ortho at bedside. : Patient was seen and examined with daughter and son-in-law at bedside. She was participating in limited PT OT based on her nonweight bearing status. Overall doing well and in good spirits but has continuous mild to moderate pain of knees and ankles bilaterally due to the fractures from the fall. Pain medications help take the edge off.       ALLERGIES  Allergies   Allergen Reactions    Macrobid [Nitrofurantoin Monohyd/M-Cryst] Anaphylaxis    Pcn [Penicillins] Hives         ROS:  Gen:   generalized weakness  Resp:  negative  CVS:   negative  GI:       negative  :     denies dysuria  MS:      Lower extremity pain due to the fall and fractures           Objective:      Visit Vitals  BP (!) 168/54 (BP 1 Location: Left upper arm, BP Patient Position: At rest)   Pulse 79   Temp 98.6 °F (37 °C)   Resp 16   Ht 5' 3\" (1.6 m)   Wt 65.8 kg (145 lb)   SpO2 90%   Breastfeeding No   BMI 25.69 kg/m²       Physical Exam:  General: NAD  Head: Normocephalic, without obvious abnormality, atraumatic  Eyes: anicteric sclerae and conjuntiva clear  ENT: lips, mucosa, and tongue normal  Neck: normal, supple, and no tenderness  Lungs: clear to auscultation  Heart: S1, S2 normal, regular rate, and regular rhythm  Abd: not distended, soft, nontender, BS present and normactive  Ext:  Bilateral immobilizers  Skin: normal skin color, no rashes, and texture normal  Neuro:  alert, oriented, no defects noted in general exam.  Psych: not anxious, cooperative, appropriate affect       Medications:  Current Facility-Administered Medications   Medication Dose Route Frequency    insulin lispro (HUMALOG) injection   SubCUTAneous AC&HS    glucose chewable tablet 16 g  4 Tablet Oral PRN    glucagon (GLUCAGEN) injection 1 mg  1 mg IntraMUSCular PRN    dextrose 10% infusion 0-250 mL  0-250 mL IntraVENous PRN    aspirin delayed-release tablet 81 mg  81 mg Oral DAILY    levothyroxine (SYNTHROID) tablet 56 mcg  56 mcg Oral ACB    metoprolol succinate (TOPROL-XL) XL tablet 50 mg  50 mg Oral DAILY    cyanocobalamin (VITAMIN B12) tablet 1,000 mcg  1,000 mcg Oral DAILY    furosemide (LASIX) tablet 20 mg  20 mg Oral DAILY    cholecalciferol (VITAMIN D3) (1000 Units /25 mcg) tablet 1,000 Units  1,000 Units Oral DAILY    amLODIPine (NORVASC) tablet 5 mg  5 mg Oral DAILY    pantoprazole (PROTONIX) tablet 40 mg  40 mg Oral ACB    oxyCODONE IR (ROXICODONE) tablet 2.5 mg  2.5 mg Oral Q4H PRN    acetaminophen (TYLENOL) tablet 650 mg  650 mg Oral Q6H    cefTRIAXone (ROCEPHIN) 1 g in 0.9% sodium chloride 10 mL IV syringe  1 g IntraVENous Q24H    sodium chloride (NS) flush 5-40 mL  5-40 mL IntraVENous Q8H    sodium chloride (NS) flush 5-40 mL  5-40 mL IntraVENous PRN    acetaminophen (TYLENOL) tablet 650 mg 650 mg Oral Q6H PRN    Or    acetaminophen (TYLENOL) suppository 650 mg  650 mg Rectal Q6H PRN    polyethylene glycol (MIRALAX) packet 17 g  17 g Oral DAILY PRN    ondansetron (ZOFRAN ODT) tablet 4 mg  4 mg Oral Q8H PRN    Or    ondansetron (ZOFRAN) injection 4 mg  4 mg IntraVENous Q6H PRN    enoxaparin (LOVENOX) injection 40 mg  40 mg SubCUTAneous DAILY        Labs:  Recent Labs     11/04/22 2107   WBC 10.4   HGB 11.2*   HCT 33.5*          Recent Labs     11/04/22 2107      K 4.0      CO2 28   *   BUN 16   CREA 0.71   CA 9.4   ALB 3.9   TBILI 0.4   ALT 28         Radiology:  No results found. The ER course, labs, imaging studies, medications, and consultants notes was reviewed by me on: November 6, 2022         Assessment/Plan:      Bilateral ankle fractures: Acute left nondisplaced  ankle fracture / Acute nondisplaced ankle fracture due to home GLF. Left ankle nondisplaced fractures of distal tibial and fibular diametaphyses and distal tibial metaphysis  Right ankle nondisplaced fractures of the medial and lateral malleoli  Non-operative management per ortho  Continue pain control with Norco prn and add low dose morphine IV for severe pain. Acute bilateral knee fractures: Acute right patellar nondisplaced closed fracture d/t GLF at home. Left knee nondisplaced fracture proximal tibial diametaphysis. Right knee nondisplaced transverse fracture of patella. Non-operative management, conservative treatment and pain control as above  Monitor for anemia with multiple fractures   Consult(s): Ortho for fractures    ABLA due to fractures. 11.2 on adm but now 8.9  Type and screen. Monitor and transfuse if needed. Is on lovenox for DVT prophy. I've asked for Ortho to give suggestion. Acute bacterial UTI:  Not the cleanest catch and minimal symptoms but given the fall at home we will go ahead and treat in case is causing weakness  Urine culture PENDING.   Continue Rocephin day 2. Tolerated it well  . GLF at home resulting in above fractures. Lives alone  Fall precautions  PT/OT/CM (concerns for returning home alone. May require LTC)    Acquired hypothyroidism. TSH wnL. Continue levothyroxine 56 mcg p.o. daily. Dysphagia. Soft mechanical diet.     DM 2:   BG monitoring and ISS coverage    Body mass index is 25.69 kg/m².:  25.0 - 29.9:  Overweight      F: None   E: Stable and monitor as /LTCneeded  N: ADULT DIET Dysphagia - Minced & Moist        Discussed:  Pt's condition, Imaging findings, Lab findings, Assessment, Care Plan, and D/C Planning discussed with: Patient, RN, and Specialist    Prophylaxis:  Lovenox SQ    Anticipated discharge disposition:  LTC    HR DC Barriers: Currently not medically stable for discharge and Placement issues SNF/LTC      Total time: -35- minutes **I personally saw and examined the patient during this time period**       Date of service:    11/6/2022                ___________________________________________________    Admitting Physician: Ezequiel Fernando MD

## 2022-11-06 NOTE — PROGRESS NOTES
Problem: Pressure Injury - Risk of  Goal: *Prevention of pressure injury  Description: Document Prieto Scale and appropriate interventions in the flowsheet. Outcome: Progressing Towards Goal  Note: Pressure Injury Interventions:  Sensory Interventions: Assess changes in LOC, Check visual cues for pain, Float heels, Keep linens dry and wrinkle-free, Maintain/enhance activity level, Minimize linen layers, Monitor skin under medical devices, Turn and reposition approx. every two hours (pillows and wedges if needed)    Moisture Interventions: Absorbent underpads, Check for incontinence Q2 hours and as needed, Internal/External urinary devices, Minimize layers, Maintain skin hydration (lotion/cream)    Activity Interventions: Increase time out of bed, Pressure redistribution bed/mattress(bed type), PT/OT evaluation    Mobility Interventions: HOB 30 degrees or less, Float heels, Pressure redistribution bed/mattress (bed type), PT/OT evaluation, Turn and reposition approx. every two hours(pillow and wedges)    Nutrition Interventions: Document food/fluid/supplement intake, Offer support with meals,snacks and hydration    Friction and Shear Interventions: HOB 30 degrees or less, Lift team/patient mobility team, Minimize layers                Problem: Patient Education: Go to Patient Education Activity  Goal: Patient/Family Education  Outcome: Progressing Towards Goal     Problem: Falls - Risk of  Goal: *Absence of Falls  Description: Document Vanessa Fall Risk and appropriate interventions in the flowsheet.   Outcome: Progressing Towards Goal  Note: Fall Risk Interventions:            Medication Interventions: Bed/chair exit alarm, Evaluate medications/consider consulting pharmacy    Elimination Interventions: Bed/chair exit alarm, Call light in reach, Patient to call for help with toileting needs    History of Falls Interventions: Bed/chair exit alarm, Room close to nurse's station         Problem: Patient Education: Go to Patient Education Activity  Goal: Patient/Family Education  Outcome: Progressing Towards Goal     Problem: Patient Education: Go to Patient Education Activity  Goal: Patient/Family Education  Outcome: Progressing Towards Goal     Problem: Patient Education: Go to Patient Education Activity  Goal: Patient/Family Education  Outcome: Progressing Towards Goal     Problem: Patient Education: Go to Patient Education Activity  Goal: Patient/Family Education  Outcome: Progressing Towards Goal

## 2022-11-06 NOTE — PROGRESS NOTES
Daughter at bedside. Daughter does not have medication list with her.  Instructed daughter to call once she is home so we can update med list.

## 2022-11-06 NOTE — PROGRESS NOTES
Problem: Pressure Injury - Risk of  Goal: *Prevention of pressure injury  Description: Document Prieto Scale and appropriate interventions in the flowsheet. Outcome: Progressing Towards Goal  Note: Pressure Injury Interventions:  Sensory Interventions: Keep linens dry and wrinkle-free, Check visual cues for pain    Moisture Interventions: Internal/External urinary devices    Activity Interventions: Assess need for specialty bed, Pressure redistribution bed/mattress(bed type)    Mobility Interventions: Assess need for specialty bed    Nutrition Interventions: Document food/fluid/supplement intake    Friction and Shear Interventions: Transfer aides:transfer board/Johana lift/ceiling lift, Transferring/repositioning devices, HOB 30 degrees or less                Problem: Patient Education: Go to Patient Education Activity  Goal: Patient/Family Education  Outcome: Progressing Towards Goal     Problem: Falls - Risk of  Goal: *Absence of Falls  Description: Document Vanessa Fall Risk and appropriate interventions in the flowsheet.   Outcome: Progressing Towards Goal  Note: Fall Risk Interventions:            Medication Interventions: Bed/chair exit alarm, Teach patient to arise slowly, Patient to call before getting OOB, Evaluate medications/consider consulting pharmacy    Elimination Interventions: Call light in reach, Patient to call for help with toileting needs    History of Falls Interventions: Door open when patient unattended, Bed/chair exit alarm

## 2022-11-07 ENCOUNTER — APPOINTMENT (OUTPATIENT)
Dept: GENERAL RADIOLOGY | Age: 87
End: 2022-11-07
Attending: PHYSICIAN ASSISTANT
Payer: MEDICARE

## 2022-11-07 LAB
BASOPHILS # BLD: 0.1 K/UL (ref 0–0.1)
BASOPHILS NFR BLD: 1 % (ref 0–1)
COVID-19 RAPID TEST, COVR: NOT DETECTED
DIFFERENTIAL METHOD BLD: ABNORMAL
EOSINOPHIL # BLD: 0.4 K/UL (ref 0–0.4)
EOSINOPHIL NFR BLD: 4 % (ref 0–7)
ERYTHROCYTE [DISTWIDTH] IN BLOOD BY AUTOMATED COUNT: 13.5 % (ref 11.5–14.5)
GLUCOSE BLD STRIP.AUTO-MCNC: 147 MG/DL (ref 65–117)
GLUCOSE BLD STRIP.AUTO-MCNC: 149 MG/DL (ref 65–117)
GLUCOSE BLD STRIP.AUTO-MCNC: 185 MG/DL (ref 65–117)
GLUCOSE BLD STRIP.AUTO-MCNC: 187 MG/DL (ref 65–117)
HCT VFR BLD AUTO: 27.1 % (ref 35–47)
HGB BLD-MCNC: 8.9 G/DL (ref 11.5–16)
IMM GRANULOCYTES # BLD AUTO: 0.1 K/UL (ref 0–0.04)
IMM GRANULOCYTES NFR BLD AUTO: 1 % (ref 0–0.5)
LYMPHOCYTES # BLD: 2.5 K/UL (ref 0.8–3.5)
LYMPHOCYTES NFR BLD: 25 % (ref 12–49)
MCH RBC QN AUTO: 31.1 PG (ref 26–34)
MCHC RBC AUTO-ENTMCNC: 32.8 G/DL (ref 30–36.5)
MCV RBC AUTO: 94.8 FL (ref 80–99)
MONOCYTES # BLD: 0.9 K/UL (ref 0–1)
MONOCYTES NFR BLD: 9 % (ref 5–13)
NEUTS SEG # BLD: 6.3 K/UL (ref 1.8–8)
NEUTS SEG NFR BLD: 60 % (ref 32–75)
NRBC # BLD: 0.02 K/UL (ref 0–0.01)
NRBC BLD-RTO: 0.2 PER 100 WBC
PLATELET # BLD AUTO: 165 K/UL (ref 150–400)
PMV BLD AUTO: 10.1 FL (ref 8.9–12.9)
RBC # BLD AUTO: 2.86 M/UL (ref 3.8–5.2)
SERVICE CMNT-IMP: ABNORMAL
SOURCE, COVRS: NORMAL
WBC # BLD AUTO: 10.1 K/UL (ref 3.6–11)

## 2022-11-07 PROCEDURE — 97530 THERAPEUTIC ACTIVITIES: CPT

## 2022-11-07 PROCEDURE — 73560 X-RAY EXAM OF KNEE 1 OR 2: CPT

## 2022-11-07 PROCEDURE — 96376 TX/PRO/DX INJ SAME DRUG ADON: CPT

## 2022-11-07 PROCEDURE — 74011000250 HC RX REV CODE- 250: Performed by: INTERNAL MEDICINE

## 2022-11-07 PROCEDURE — 74011250637 HC RX REV CODE- 250/637: Performed by: INTERNAL MEDICINE

## 2022-11-07 PROCEDURE — 36415 COLL VENOUS BLD VENIPUNCTURE: CPT

## 2022-11-07 PROCEDURE — 73610 X-RAY EXAM OF ANKLE: CPT

## 2022-11-07 PROCEDURE — 85025 COMPLETE CBC W/AUTO DIFF WBC: CPT

## 2022-11-07 PROCEDURE — 74011636637 HC RX REV CODE- 636/637: Performed by: INTERNAL MEDICINE

## 2022-11-07 PROCEDURE — 87635 SARS-COV-2 COVID-19 AMP PRB: CPT

## 2022-11-07 PROCEDURE — 74011250637 HC RX REV CODE- 250/637: Performed by: NURSE PRACTITIONER

## 2022-11-07 PROCEDURE — 74011250636 HC RX REV CODE- 250/636: Performed by: INTERNAL MEDICINE

## 2022-11-07 PROCEDURE — 74011250637 HC RX REV CODE- 250/637: Performed by: HOSPITALIST

## 2022-11-07 PROCEDURE — G0378 HOSPITAL OBSERVATION PER HR: HCPCS

## 2022-11-07 PROCEDURE — 74011250636 HC RX REV CODE- 250/636: Performed by: HOSPITALIST

## 2022-11-07 PROCEDURE — 82962 GLUCOSE BLOOD TEST: CPT

## 2022-11-07 PROCEDURE — 96372 THER/PROPH/DIAG INJ SC/IM: CPT

## 2022-11-07 PROCEDURE — 74011000250 HC RX REV CODE- 250: Performed by: HOSPITALIST

## 2022-11-07 RX ADMIN — ACETAMINOPHEN 650 MG: 325 TABLET, FILM COATED ORAL at 17:00

## 2022-11-07 RX ADMIN — CEFTRIAXONE 1 G: 1 INJECTION, POWDER, FOR SOLUTION INTRAMUSCULAR; INTRAVENOUS at 23:08

## 2022-11-07 RX ADMIN — OXYCODONE 2.5 MG: 5 TABLET ORAL at 17:00

## 2022-11-07 RX ADMIN — METOPROLOL SUCCINATE 50 MG: 50 TABLET, EXTENDED RELEASE ORAL at 08:16

## 2022-11-07 RX ADMIN — ENOXAPARIN SODIUM 40 MG: 100 INJECTION SUBCUTANEOUS at 08:16

## 2022-11-07 RX ADMIN — POLYETHYLENE GLYCOL 3350 17 G: 17 POWDER, FOR SOLUTION ORAL at 08:16

## 2022-11-07 RX ADMIN — ACETAMINOPHEN 650 MG: 325 TABLET, FILM COATED ORAL at 00:00

## 2022-11-07 RX ADMIN — PANTOPRAZOLE SODIUM 40 MG: 40 TABLET, DELAYED RELEASE ORAL at 06:30

## 2022-11-07 RX ADMIN — SODIUM CHLORIDE, PRESERVATIVE FREE 10 ML: 5 INJECTION INTRAVENOUS at 14:00

## 2022-11-07 RX ADMIN — Medication 1000 UNITS: at 08:16

## 2022-11-07 RX ADMIN — SODIUM CHLORIDE, PRESERVATIVE FREE 10 ML: 5 INJECTION INTRAVENOUS at 23:08

## 2022-11-07 RX ADMIN — ACETAMINOPHEN 650 MG: 325 TABLET, FILM COATED ORAL at 23:12

## 2022-11-07 RX ADMIN — SODIUM CHLORIDE, PRESERVATIVE FREE 10 ML: 5 INJECTION INTRAVENOUS at 06:07

## 2022-11-07 RX ADMIN — FUROSEMIDE 20 MG: 20 TABLET ORAL at 08:16

## 2022-11-07 RX ADMIN — AMLODIPINE BESYLATE 5 MG: 5 TABLET ORAL at 08:16

## 2022-11-07 RX ADMIN — ACETAMINOPHEN 650 MG: 325 TABLET, FILM COATED ORAL at 06:00

## 2022-11-07 RX ADMIN — ACETAMINOPHEN 650 MG: 325 TABLET, FILM COATED ORAL at 11:23

## 2022-11-07 RX ADMIN — OXYCODONE 2.5 MG: 5 TABLET ORAL at 09:55

## 2022-11-07 RX ADMIN — CYANOCOBALAMIN TAB 500 MCG 1000 MCG: 500 TAB at 08:16

## 2022-11-07 RX ADMIN — ASPIRIN 81 MG: 81 TABLET, COATED ORAL at 08:16

## 2022-11-07 RX ADMIN — LEVOTHYROXINE SODIUM 56 MCG: 0.11 TABLET ORAL at 06:30

## 2022-11-07 RX ADMIN — INSULIN LISPRO 2 UNITS: 100 INJECTION, SOLUTION INTRAVENOUS; SUBCUTANEOUS at 08:16

## 2022-11-07 NOTE — PROGRESS NOTES
Occupational Therapy:  11/07/22    Chart reviewed in prep for OT tx, spoke to RN. Met with Ortho PA, Demian, regarding pt. Plan for repeat imaging and determine if additional/different bracing needed for L LE. Will defer this AM and follow up later today as able and appropriate.      Thank you,  Sunday Claudia, OTR/BRYANT

## 2022-11-07 NOTE — PROGRESS NOTES
Orthopaedic Progress Note    2022 10:54 AM     Patient: Jean Booth MRN: [id number]  SSN: [id number]    YOB: 1931  Age: 80 y.o. Sex: female      Admit date:  2022  Admitting Physician:  Rebekah Coyne DO   Consulting Physician(s): Treatment Team: Attending Provider: Serenity Foster MD; Consulting Provider: Cuong Robles MD; Physician Assistant: Elroy Dunlap, [address] [place], [place] [address]caprice Rodrigez; Consulting Provider: Magalys Cooper DO; Utilization Review: Ingrid Johns; Care Manager: Mark Mayo; Care Manager: Leonor Hale; Primary Nurse: Jael Acosta RN; Physical Therapy Assistant: Kinga Mason; Occupational Therapist: Dorothy Nye OT    SUBJECTIVE:     Jean Booth is a 80 y.o. female is sitting upright with therapy. She denies increasing knee, lower leg or ankle pain. No complaints of nausea, vomiting, dizziness, lightheadedness, chest pain, or shortness of breath. OBJECTIVE:       Physical Exam:  General: Alert, cooperative, no distress. Respiratory: Respirations unlabored  Neurological:  Neurovascular exam within normal limits. Motor: + DF/PF. Musculoskeletal: Calves soft, supple, non-tender upon palpation. Left lower leg is soft and compressible. Ecchymosis is present over left lower leg. Right lower leg is soft and compressible. All compartments of both lower legs are soft and compressible. SILT right and LLE. +BCR of all digits. +DP and PT pulses both feet. Feet are warm. Dressing/Wound:  Clean, dry and intact. No significant erythema or swelling.       Vital Signs:        Patient Vitals for the past 8 hrs:   BP Temp Pulse Resp SpO2   22 1050 (!) 148/63 97.9 °F (36.6 °C) 74 19 91 %   22 0805 -- -- -- -- 93 %   22 0730 (!) 165/67 98.3 °F (36.8 °C) 73 18 93 %                                          Temp (24hrs), Av.2 °F (36.8 °C), Min:97.9 °F (36.6 °C), Max:98.5 °F (36.9 °C)      Labs:        Recent Labs 11/07/22  0136   HCT 27.1*   HGB 8.9*     Lab Results   Component Value Date/Time    Sodium 137 11/04/2022 09:07 PM    Potassium 4.0 11/04/2022 09:07 PM    Chloride 102 11/04/2022 09:07 PM    CO2 28 11/04/2022 09:07 PM    Glucose 152 (H) 11/04/2022 09:07 PM    BUN 16 11/04/2022 09:07 PM    Creatinine 0.71 11/04/2022 09:07 PM    Calcium 9.4 11/04/2022 09:07 PM       PT/OT:                Patient mobility  Bed Mobility Training  Supine to Sit: Moderate assistance, Assist x2  Sit to Supine: Moderate assistance, Assist x2  Scooting: Maximum assistance, Total assistance                      ASSESSMENT / PLAN:   Active Problems:    Bilateral ankle fractures (11/4/2022)      Closed patellar sleeve fracture of right knee (11/4/2022)         Right transverse patella fracture, nondisplaced  Right medial and lateral malleolus fractures, nondisplaced  Left Proximal tibia fracture, nondisplaced  Left distal tibia and fibula fractures, nondisplaced     P: 1. PT/OT: Left and right lower extremities are NWB with bilateral CAM walkers. Can dangle legs. Avoid PROM of left knee. Avoid PROM of right knee  2. Dr. Sanrda Han reviewed imaging. Going to proceed with nonoperative management for now. Repeat imaging is being done this afternoon. If there is displacement of the left distal tibia fracture then she will need a CT and likely IM nail versus plating. 3. DVT ppx: Continue with Lovenox 40 mg SC daily  4.  Pain management: Tylenol 650mg q6 hours      Signed By:  Brianna Melgoza, [address] [place] [address] 114

## 2022-11-07 NOTE — PROGRESS NOTES
Problem: Mobility Impaired (Adult and Pediatric)  Goal: *Acute Goals and Plan of Care (Insert Text)  Description: FUNCTIONAL STATUS PRIOR TO ADMISSION: Patient was modified independent using a rollator for functional mobility. HOME SUPPORT PRIOR TO ADMISSION: The patient lived alone with daughter in the are to provide assistance if needed. Physical Therapy Goals  Initiated 2022  1. Patient will move from supine to sit and sit to supine , scoot up and down, and roll side to side in bed with supervision/set-up within 7 day(s). 2.  Patient will transfer from bed to chair and chair to bed via lateral scoot and/or sliding board with minimal assistance/contact guard assist using the least restrictive device within 7 day(s). Outcome: Progressing Towards Goal  Note:   PHYSICAL THERAPY TREATMENT  Patient: Doyce Apgar (60 y.o. female)  Date: 2022  Diagnosis: Bilateral ankle fractures [S82.891A, S82.892A]  Closed patellar sleeve fracture of right knee [S82.091A] <principal problem not specified>      Precautions: Fall  Chart, physical therapy assessment, plan of care and goals were reviewed. ASSESSMENT  Patient continues with skilled PT services and progressing towards goals. Pt reports improving pain control with B CAM boot in place. Educated patient to in positioning to promoted extension in LLE. Mod/Max A to manage BLE for bed mobility >sitting EOB. Reviewed thera ex for BUE in sitting and IS. Unable to scoot toward [address] [address] and required Max A to return to supine. Spoke with Ortho PA, plan to repeat radiographs and determine if additional/different bracing needed for LLE. Current Level of Function Impacting Discharge (mobility/balance): assist x 2    Other factors to consider for discharge:          PLAN :  Patient continues to benefit from skilled intervention to address the above impairments. Continue treatment per established plan of care. to address goals.     Recommendation for discharge: (in order for the patient to meet his/her long term goals)  Therapy up to 5 days/week in SNF setting    This discharge recommendation:  Has been made in collaboration with the attending provider and/or case management    IF patient discharges home will need the following DME: to be determined (TBD)       SUBJECTIVE:   Patient stated maybe a little better.     OBJECTIVE DATA SUMMARY:   Critical Behavior:  Neurologic State: Alert  Orientation Level: Oriented X4  Cognition: Follows commands  Safety/Judgement: Awareness of environment  Functional Mobility Training:  Bed Mobility:     Supine to Sit: Moderate assistance;Assist x2  Sit to Supine: Moderate assistance;Assist x2  Scooting: Maximum assistance; Total assistance        Transfers:                                   Balance:  Sitting: High guard  Sitting - Static: Good (unsupported)  Sitting - Dynamic: Good (unsupported)  Ambulation/Gait Training:                                                        Stairs: Therapeutic Exercises:     Pain Rating:  LLE 6/10 RLE3/10    Activity Tolerance:   Good    After treatment patient left in no apparent distress:   Supine in bed, Call bell within reach, and Bed / chair alarm activated    COMMUNICATION/COLLABORATION:   The patients plan of care was discussed with: Registered nurseDmitry Durbin   Time Calculation: 23 mins

## 2022-11-07 NOTE — PROGRESS NOTES
[facility]  [address], [facility] [address]  [phone]         Hospitalist Progress Note        NAME:  Itzel Rajan   :  1931   MRN:  [id number]    Date/Time:  2022     Patient PCP:  None    Emergency Contact:    Extended Emergency Contact Information  Primary Emergency Contact: [address] Phone: [phone]  Mobile Phone: [phone]  Relation: Daughter      Code: Full Code     Isolation Precautions: There are currently no Active Isolations        Subjective:     REASON FOR VISIT:  Recheck Ankle and knee Fxs     HPI & INTERVAL HISTORY:     Ms. Shana Valdez is a 80 y.o. female with history that includes DM 2, dysphagia, HTN, hypothyroid and history of ankle sprains using walker since 2021 presents after GLF at home felt that ankles gave out on her causing to have GLF at home. Fall resulted in bilateral ankle and right knee pain. Right knee and ankles found to be fractured. : Resting comfortably w/o complaints. Has bilateral boots on. Will need SNF but pending auth. : No events overnight. C/O continuous LLE pain moderate and RLE pain mild due to fractures worsens with movment on the left. Pain meds help. Will get Boots today per d/w ortho at bedside. : Patient was seen and examined with daughter and son-in-law at bedside. She was participating in limited PT OT based on her nonweight bearing status. Overall doing well and in good spirits but has continuous mild to moderate pain of knees and ankles bilaterally due to the fractures from the fall. Pain medications help take the edge off.       ALLERGIES  Allergies   Allergen Reactions    Macrobid [Nitrofurantoin Monohyd/M-Cryst] Anaphylaxis    Pcn [Penicillins] Hives         ROS:  Gen:   generalized weakness  Resp:  negative  CVS:   negative  GI:       negative  :     denies dysuria  MS:      Lower extremity pain due to the fall and fractures           Objective:      Visit Vitals  BP (!) 162/74 (BP 1 Location: Right arm, BP Patient Position: At rest)   Pulse 90   Temp 98.1 °F (36.7 °C)   Resp 18   Ht 5' 3\" (1.6 m)   Wt 65.8 kg (145 lb)   SpO2 90%   Breastfeeding No   BMI 25.69 kg/m²       Physical Exam:  General: NAD  Head: Normocephalic, without obvious abnormality, atraumatic  Eyes: anicteric sclerae and conjuntiva clear  ENT: lips, mucosa, and tongue normal  Neck: normal, supple, and no tenderness  Lungs: CTA  Heart: S1, S2 normal, regular rate, and regular rhythm  Abd: not distended, soft, nontender, BS present and normactive  Ext: Bilateral Boots  Skin: normal skin color, no rashes, and texture normal  Neuro:  alert, oriented, no defects noted in general exam.  Psych: not anxious, cooperative, appropriate affect       Medications:  Current Facility-Administered Medications   Medication Dose Route Frequency    polyethylene glycol (MIRALAX) packet 17 g  17 g Oral DAILY    insulin lispro (HUMALOG) injection   SubCUTAneous AC&HS    glucose chewable tablet 16 g  4 Tablet Oral PRN    glucagon (GLUCAGEN) injection 1 mg  1 mg IntraMUSCular PRN    dextrose 10% infusion 0-250 mL  0-250 mL IntraVENous PRN    aspirin delayed-release tablet 81 mg  81 mg Oral DAILY    levothyroxine (SYNTHROID) tablet 56 mcg  56 mcg Oral ACB    metoprolol succinate (TOPROL-XL) XL tablet 50 mg  50 mg Oral DAILY    cyanocobalamin (VITAMIN B12) tablet 1,000 mcg  1,000 mcg Oral DAILY    furosemide (LASIX) tablet 20 mg  20 mg Oral DAILY    cholecalciferol (VITAMIN D3) (1000 Units /25 mcg) tablet 1,000 Units  1,000 Units Oral DAILY    amLODIPine (NORVASC) tablet 5 mg  5 mg Oral DAILY    pantoprazole (PROTONIX) tablet 40 mg  40 mg Oral ACB    oxyCODONE IR (ROXICODONE) tablet 2.5 mg  2.5 mg Oral Q4H PRN    acetaminophen (TYLENOL) tablet 650 mg  650 mg Oral Q6H    cefTRIAXone (ROCEPHIN) 1 g in 0.9% sodium chloride 10 mL IV syringe  1 g IntraVENous Q24H    sodium chloride (NS) flush 5-40 mL  5-40 mL IntraVENous Q8H    sodium chloride (NS) flush 5-40 mL  5-40 mL IntraVENous PRN    acetaminophen (TYLENOL) tablet 650 mg  650 mg Oral Q6H PRN    Or    acetaminophen (TYLENOL) suppository 650 mg  650 mg Rectal Q6H PRN    ondansetron (ZOFRAN ODT) tablet 4 mg  4 mg Oral Q8H PRN    Or    ondansetron (ZOFRAN) injection 4 mg  4 mg IntraVENous Q6H PRN    enoxaparin (LOVENOX) injection 40 mg  40 mg SubCUTAneous DAILY        Labs:  Recent Labs     11/07/22  0136   WBC 10.1   HGB 8.9*   HCT 27.1*          Recent Labs     11/04/22  2107      K 4.0      CO2 28   *   BUN 16   CREA 0.71   CA 9.4   ALB 3.9   TBILI 0.4   ALT 28         Radiology:  No results found. The ER course, labs, imaging studies, medications, and consultants notes was reviewed by me on: November 7, 2022         Assessment/Plan:      Bilateral ankle fractures: Acute left nondisplaced  ankle fracture / Acute nondisplaced ankle fracture due to home GLF. Left ankle nondisplaced fractures of distal tibial and fibular diametaphyses and distal tibial metaphysis  Right ankle nondisplaced fractures of the medial and lateral malleoli  Non-operative management per ortho  Continue Norco prn and low dose morphine IV for severe pain. Acute bilateral knee fractures: Acute right patellar nondisplaced closed fracture d/t GLF at home. Left knee nondisplaced fracture proximal tibial diametaphysis. Right knee nondisplaced transverse fracture of patella. Conservative therapy and pain control as above  Monitor for anemia with multiple fractures   Consult(s): Ortho for fractures    ABLA due to fractures. 11.2 on adm but now 8.9 - Monitor   Monitor and transfuse if needed. Is on lovenox for DVT prophy. I've asked for Ortho to give suggestion. Acute bacterial UTI:  Not the cleanest catch and minimal symptoms but given the fall at home we will go ahead and treat in case is causing weakness  Urine culture PENDING. Continue Rocephin day 3. Tolerated it well  . GLF at home resulting in above fractures. Lives alone  Fall precautions  PT/OT/CM (concerns for returning home alone. May require LTC)    Acquired hypothyroidism. TSH wnL. Continue levothyroxine 56 mcg p.o. daily. Dysphagia. Soft mechanical diet.     DM 2:   BG monitoring and ISS coverage    Body mass index is 25.69 kg/m².:  25.0 - 29.9:  Overweight      F: None   E: Stable and monitor as /LTCneeded  N: ADULT DIET Dysphagia - Minced & Moist        Discussed:  Pt's condition, Imaging findings, Lab findings, Assessment, Care Plan, and D/C Planning discussed with: Patient, RN, and Specialist    Prophylaxis:  Lovenox SQ    Anticipated discharge disposition:  LTC    HR DC Barriers: Currently not medically stable for discharge and Placement issues SNF/LTC      Total time:  minutes **I personally saw and examined the patient during this time period**       Date of service:    11/7/2022                ___________________________________________________    Admitting Physician: Luis Antonio Moe MD

## 2022-11-07 NOTE — PROGRESS NOTES
11/7/2022    3:05 PM  CM received acceptance from [id number], and they have initiated auth.     1:30 PM  Care Management Progress Note      ICD-10-CM ICD-9-CM    1. Closed fracture of left ankle, initial encounter  S82.892A 824.8       2. Closed fracture of right ankle, initial encounter  S82.891A 824.8       3. Closed fracture of proximal end of left tibia, unspecified fracture morphology, initial encounter  S82.102A 823.00       4. Closed nondisplaced fracture of right patella, unspecified fracture morphology, initial encounter  S82.001A 822.0           RUR:  NA - OBS  Risk Level: [x]Low []Moderate []High  Value-based purchasing: [] Yes [x] No  Bundle patient: [] Yes [x] No   Specify:     Transition of care plan:  Awaiting medical clearance and DC order. PT/OT treating. Ortho following. SNF - Pt indicated [facility] as preference. CM completed referral via [facility], and is awaiting response. Chad Baxter is needed. Outpatient follow-up. Stretcher transport required.

## 2022-11-07 NOTE — PROGRESS NOTES
Problem: Lower Extremity Fracture:Day of Admission  Goal: Off Pathway (Use only if patient is Off Pathway)  Outcome: Progressing Towards Goal  Goal: Activity/Safety  Outcome: Progressing Towards Goal  Goal: Consults, if ordered  Outcome: Progressing Towards Goal  Goal: Diagnostic Test/Procedures  Outcome: Progressing Towards Goal  Goal: Nutrition/Diet  Outcome: Progressing Towards Goal  Goal: Medications  Outcome: Progressing Towards Goal  Goal: Respiratory  Outcome: Progressing Towards Goal  Goal: Treatments/Interventions/Procedures  Outcome: Progressing Towards Goal  Goal: Psychosocial  Outcome: Progressing Towards Goal  Goal: *Optimal pain control at patient's stated goal  Outcome: Progressing Towards Goal  Goal: *Hemodynamically stable  Outcome: Progressing Towards Goal  Goal: *Adequate oxygenation  Outcome: Progressing Towards Goal     Problem: Falls - Risk of  Goal: *Absence of Falls  Description: Document Vanessa Fall Risk and appropriate interventions in the flowsheet.   11/7/2022 0815 by Melania Garcia RN  Outcome: Progressing Towards Goal  Note: Fall Risk Interventions:       Mentation Interventions: Bed/chair exit alarm    Medication Interventions: Bed/chair exit alarm    Elimination Interventions: Call light in reach, Bed/chair exit alarm    History of Falls Interventions: Bed/chair exit alarm      11/7/2022 0814 by Melania Garcia RN  Outcome: Progressing Towards Goal  Note: Fall Risk Interventions:       Mentation Interventions: Bed/chair exit alarm    Medication Interventions: Bed/chair exit alarm    Elimination Interventions: Call light in reach, Bed/chair exit alarm    History of Falls Interventions: Bed/chair exit alarm

## 2022-11-07 NOTE — PROGRESS NOTES
Problem: Pressure Injury - Risk of  Goal: *Prevention of pressure injury  Description: Document Prieto Scale and appropriate interventions in the flowsheet. Outcome: Progressing Towards Goal  Note: Pressure Injury Interventions:  Sensory Interventions: Assess changes in LOC, Check visual cues for pain, Float heels, Keep linens dry and wrinkle-free, Maintain/enhance activity level, Minimize linen layers, Monitor skin under medical devices, Turn and reposition approx. every two hours (pillows and wedges if needed)    Moisture Interventions: Absorbent underpads, Check for incontinence Q2 hours and as needed, Internal/External urinary devices, Minimize layers, Maintain skin hydration (lotion/cream)    Activity Interventions: Increase time out of bed, Pressure redistribution bed/mattress(bed type), PT/OT evaluation    Mobility Interventions: HOB 30 degrees or less, Float heels, Pressure redistribution bed/mattress (bed type), PT/OT evaluation, Turn and reposition approx.  every two hours(pillow and wedges)    Nutrition Interventions: Document food/fluid/supplement intake, Offer support with meals,snacks and hydration    Friction and Shear Interventions: HOB 30 degrees or less, Lift team/patient mobility team, Minimize layers

## 2022-11-08 VITALS
OXYGEN SATURATION: 96 % | WEIGHT: 145 LBS | HEIGHT: 63 IN | SYSTOLIC BLOOD PRESSURE: 151 MMHG | HEART RATE: 60 BPM | RESPIRATION RATE: 16 BRPM | TEMPERATURE: 98.2 F | DIASTOLIC BLOOD PRESSURE: 73 MMHG | BODY MASS INDEX: 25.69 KG/M2

## 2022-11-08 LAB
BACTERIA SPEC CULT: ABNORMAL
CC UR VC: ABNORMAL
GLUCOSE BLD STRIP.AUTO-MCNC: 144 MG/DL (ref 65–117)
GLUCOSE BLD STRIP.AUTO-MCNC: 166 MG/DL (ref 65–117)
HCT VFR BLD AUTO: 25.7 % (ref 35–47)
HGB BLD-MCNC: 8.5 G/DL (ref 11.5–16)
SERVICE CMNT-IMP: ABNORMAL

## 2022-11-08 PROCEDURE — 82962 GLUCOSE BLOOD TEST: CPT

## 2022-11-08 PROCEDURE — 74011250637 HC RX REV CODE- 250/637: Performed by: HOSPITALIST

## 2022-11-08 PROCEDURE — 74011250636 HC RX REV CODE- 250/636: Performed by: INTERNAL MEDICINE

## 2022-11-08 PROCEDURE — 85018 HEMOGLOBIN: CPT

## 2022-11-08 PROCEDURE — 96372 THER/PROPH/DIAG INJ SC/IM: CPT

## 2022-11-08 PROCEDURE — 74011250637 HC RX REV CODE- 250/637: Performed by: NURSE PRACTITIONER

## 2022-11-08 PROCEDURE — G0378 HOSPITAL OBSERVATION PER HR: HCPCS

## 2022-11-08 PROCEDURE — 74011250637 HC RX REV CODE- 250/637: Performed by: INTERNAL MEDICINE

## 2022-11-08 PROCEDURE — 74011000250 HC RX REV CODE- 250: Performed by: INTERNAL MEDICINE

## 2022-11-08 PROCEDURE — 36415 COLL VENOUS BLD VENIPUNCTURE: CPT

## 2022-11-08 RX ORDER — ATORVASTATIN CALCIUM 20 MG/1
20 TABLET, FILM COATED ORAL
Status: DISCONTINUED | OUTPATIENT
Start: 2022-11-08 | End: 2022-11-08 | Stop reason: HOSPADM

## 2022-11-08 RX ORDER — ACETAMINOPHEN 325 MG/1
650 TABLET ORAL EVERY 6 HOURS
Qty: 80 TABLET | Refills: 0 | Status: SHIPPED | OUTPATIENT
Start: 2022-11-08 | End: 2022-11-18

## 2022-11-08 RX ORDER — ENOXAPARIN SODIUM 100 MG/ML
40 INJECTION SUBCUTANEOUS DAILY
Qty: 10 ML | Refills: 0 | Status: SHIPPED
Start: 2022-11-09 | End: 2022-12-04

## 2022-11-08 RX ORDER — METOPROLOL SUCCINATE 50 MG/1
50 TABLET, EXTENDED RELEASE ORAL 2 TIMES DAILY
Status: DISCONTINUED | OUTPATIENT
Start: 2022-11-08 | End: 2022-11-08 | Stop reason: HOSPADM

## 2022-11-08 RX ORDER — AMLODIPINE BESYLATE 5 MG/1
5 TABLET ORAL 2 TIMES DAILY
Status: DISCONTINUED | OUTPATIENT
Start: 2022-11-08 | End: 2022-11-08 | Stop reason: HOSPADM

## 2022-11-08 RX ORDER — METFORMIN HYDROCHLORIDE 500 MG/1
1000 TABLET ORAL
Status: DISCONTINUED | OUTPATIENT
Start: 2022-11-08 | End: 2022-11-08 | Stop reason: HOSPADM

## 2022-11-08 RX ORDER — LEVOTHYROXINE SODIUM 112 UG/1
112 TABLET ORAL
Status: DISCONTINUED | OUTPATIENT
Start: 2022-11-09 | End: 2022-11-08 | Stop reason: HOSPADM

## 2022-11-08 RX ADMIN — SODIUM CHLORIDE, PRESERVATIVE FREE 10 ML: 5 INJECTION INTRAVENOUS at 07:50

## 2022-11-08 RX ADMIN — METOPROLOL SUCCINATE 50 MG: 50 TABLET, EXTENDED RELEASE ORAL at 08:38

## 2022-11-08 RX ADMIN — CYANOCOBALAMIN TAB 500 MCG 1000 MCG: 500 TAB at 08:35

## 2022-11-08 RX ADMIN — POLYETHYLENE GLYCOL 3350 17 G: 17 POWDER, FOR SOLUTION ORAL at 08:34

## 2022-11-08 RX ADMIN — OXYCODONE 2.5 MG: 5 TABLET ORAL at 01:44

## 2022-11-08 RX ADMIN — FUROSEMIDE 20 MG: 20 TABLET ORAL at 08:34

## 2022-11-08 RX ADMIN — AMLODIPINE BESYLATE 5 MG: 5 TABLET ORAL at 08:38

## 2022-11-08 RX ADMIN — Medication 1000 UNITS: at 08:34

## 2022-11-08 RX ADMIN — ENOXAPARIN SODIUM 40 MG: 100 INJECTION SUBCUTANEOUS at 08:35

## 2022-11-08 RX ADMIN — ASPIRIN 81 MG: 81 TABLET, COATED ORAL at 08:34

## 2022-11-08 RX ADMIN — PANTOPRAZOLE SODIUM 40 MG: 40 TABLET, DELAYED RELEASE ORAL at 08:35

## 2022-11-08 RX ADMIN — ACETAMINOPHEN 650 MG: 325 TABLET, FILM COATED ORAL at 05:58

## 2022-11-08 RX ADMIN — ACETAMINOPHEN 650 MG: 325 TABLET, FILM COATED ORAL at 11:04

## 2022-11-08 NOTE — DISCHARGE INSTRUCTIONS
Lower Extremity Fracture Discharge Instructions    Michaela Nova M.D. Please take the time to review the following instructions before you leave the hospital and use them as guidelines during your recovery from surgery. If you have any questions you may contact my office at [phone]. Weight Bearing Status / Braces: You are to wear your CAM walkers at all times. You are NON WEIGHT BEARING ON BOTH LEGS!!! Mertha Oliva / Bathing: You may shower or take a bath. Ice / Elevation:     Continue ice and elevation consistently for 48 hours from injury. Diet:     You may advance to your regular diet as tolerated. Increase your clear liquid intake for the next 2 to 3 days. Medication:     1. You will be given a prescription for pain medication when you are discharged for the hospital. Take the medication as needed according to the directions on the prescription bottle. Possible side effects ofthe medication include dizziness, headache, nausea, vomiting, constipation and urinary retention. If you experience any ofthese side effects call the office so that we can assist you in relieving them. Discontinue the use ofthe pain medication if you develop itching, rash, shortness ofbreath or difficulties swallowing. If these symptoms become severe or aren't relieved by discontinuing the medication you should seek immediate medical attention. Refills of pain medication are authorized during office hours only. (8am - 5pm Mon. thru Fri.)     You may resume the medication you were taking prior to your surgery. Pain medication may change the effects of any antidepressant medication you are taking. Ifyou have any questions about possible interactions between your regular medications and the pain medication you should consult the physician who prescribes your regular medications. Other medication notes: Follow Up Appointment:     Please call for followup appointment.  Please let our  know you are scheduling a post-op appointment. Signs and Symptoms to be Aware of: If any of the following signs and symptoms occur, you should contact the office. Please be advised ifa problem arises which you feel requires immediate medical attention or you are unable to contact the office you should seek immediate medical attention at the emergency department or other health care facility you have access to. Signs and symptoms to watch for include:     1. A sudden increase in swelling and l or redness or warmth at the area your surgery was performed which isn't relieved by rest, ice and elevation. 2 Oral temperature greater than 101 degrees for 12 hours or more which isn't relieved by an increase in fluid intake and taking two Tylenol every 4-6 hours. 3 Excessive drainage from your incisions, or drainage which hasn't stopped by 72 hours after your surgery despite applying a compressive dressing, ice and elevation. 4 Calfpain, tenderness, redness or swelling which isn't relieved with rest and elevation. 5 Fever, chills, shortness ofbreath, chest pain, nausea, vomiting or other signs and symptoms which are of concern to you.

## 2022-11-08 NOTE — PROGRESS NOTES
Bedside shift change report given to [address] (oncoming nurse) by Elloitt Carrillo (offgoing nurse). Report included the following information SBAR, Kardex, MAR, Accordion, Recent Results, and Med Rec Status.

## 2022-11-08 NOTE — PROGRESS NOTES
Problem: Falls - Risk of  Goal: *Absence of Falls  Description: Document Maegan Bin Fall Risk and appropriate interventions in the flowsheet.   Outcome: Progressing Towards Goal  Note: Fall Risk Interventions:       Mentation Interventions: Bed/chair exit alarm    Medication Interventions: Bed/chair exit alarm    Elimination Interventions: Call light in reach, Bed/chair exit alarm    History of Falls Interventions: Bed/chair exit alarm

## 2022-11-08 NOTE — PROGRESS NOTES
Medicine list presented to Doctor Wei Mast and he adjusted the meds as the patient takes them from home. And changed HG draws to daily.

## 2022-11-08 NOTE — PROGRESS NOTES
11/8/2022  10:05 AM  Transition of Care Plan to SNF/Rehab    SNF/Rehab Transition:  Patient has been accepted to [facility] and meets criteria for admission. Pepe Andrade has been received. Patient will transported by [facility] stretcher transport and expected to leave at 1:00 PM. Packet in chart, and PCS attached in AllScripts. No transport auth required. Communication to Patient/Family:  Met with patient and she is agreeable to the transition plan. Communication to SNF/Rehab:  Bedside RN, Oswald Cushing, has been notified to update the transition plan to the facility and call report (phone number [phone], Tahir, RM 400B). Discharge information has been updated on the AVS.     Discharge instructions to be fax'd to facility via [address] [place] [address]. Nursing Please include all hard scripts for controlled substances, med rec and dc summary, and AVS in packet. Nursing, please discuss the following applicable information with Robby of [facility] nursing during report:     Vic De La Rosa with (X) only those applicable:    Medication:  []  Medications will be available at the facility  []  IV Antibiotics   []  Controlled Substance - hard copy to be sent with patient   []  Weekly Labs   Documents:  [] Hard RX  [] MAR  [] Kardex  [] AVS  []Transfer Summary  []Discharge   Equipment:  []  CPAP/BiPAP  []  Wound Vacuum  []  Willett or Urinary Device  []  PICC/Central Line  []  Nebulizer  []  Ventilator   Treatment:  []Isolation (for MRSA, VRE, etc.)  []Surgical Drain Management  []Tracheostomy Care  []Dressing Changes  []Dialysis with transportation and chair time. []PEG Care  []Oxygen  []Daily Weights for Heart Failure   Dietary:  []Any diet limitations  []Tube Feedings   []Total Parenteral Management (TPN)   Eligible for Medicaid Long Term Services and Supports  Yes:  [] Eligible for medical assistance or will become eligible within 180 days and UAI completed.    [] Provider/Patient and/or support system has requested screening. [] UAI copy provided to patient or responsible party. [] UAI unavailable at discharge will send once processed to SNF provider. [] UAI unavailable at discharged mailed to patient  No:   [x] Private pay and is not financially eligible for Medicaid within the next 180 days. [] Reside out-of-state. [] A residents of a state owned/operated facility that is licensed  by [address] [facility] [facility] or [facility]  [] Enrollment in [facility] services  [] [address] [place] [address] [facility]  [x] Patient /Family declines to have screening completed or provide financial information for screening     Financial Resources:  Medicaid    [] Initiated and application pending   [] Full coverage     Advanced Care Plan:  []Surrogate Decision Maker of Care  []POA  []Communicated Code Status (DDNR\", \"Full\")    Other  Care Management Interventions  PCP Verified by CM: Yes (June 2022)  Mode of Transport at Discharge:  Other (see comment) (TBD- likely a stretcher)  Transition of Care Consult (CM Consult): Discharge Planning  Physical Therapy Consult: Yes  Occupational Therapy Consult: Yes  Speech Therapy Consult: No  Support Systems: Child(amado)  Confirm Follow Up Transport: Family  Discharge Location  Patient Expects to be Discharged to[de-identified] Skilled nursing facility

## 2022-11-08 NOTE — PROGRESS NOTES
Called and gave report to Dru milligan, nursing taking over care of patient at [facility]. Report included the following information SBAR, Kardex, ED Summary, Intake/Output, MAR, Recent Results, and Med Rec Status. I have reviewed discharge instructions with the patient. The patient verbalized understanding. IV removed without difficulty, patient given opportunity to ask questions.

## 2022-11-08 NOTE — PROGRESS NOTES
11/8/2022  9:40 AM  Care Management Progress Note      ICD-10-CM ICD-9-CM    1. Closed fracture of left ankle, initial encounter  S82.892A 824.8       2. Closed fracture of right ankle, initial encounter  S82.891A 824.8       3. Closed fracture of proximal end of left tibia, unspecified fracture morphology, initial encounter  S82.102A 823.00       4. Closed nondisplaced fracture of right patella, unspecified fracture morphology, initial encounter  S82.001A 822.0           RUR:  NA - OBS  Risk Level: [x]Low []Moderate []High  Value-based purchasing: [] Yes [x] No  Bundle patient: [] Yes [x] No   Specify:     Transition of care plan:  Discharge pending placement. PT/OT treating. SNF - Auth pending with Robby of [facility]. Outpatient follow-up. [facility] stretcher transport on will call. Auth not required.

## 2022-11-08 NOTE — PROGRESS NOTES
Xrays reviewed. There is no displacement of the left proximal or distal tibia fractures. The right medial and lateral malleoli fractures remained nondisplaced. The right transverse patella fracture is nearly indiscernible on the xray. Continue with NWB status BL LE. Followup with Dr. Melyssa Graham in 7 days. DC ok today. Discussed with patient yesterday and today. Please PerfectServe with questions. Our DC is in the chart.

## 2022-11-11 NOTE — DISCHARGE SUMMARY
Hospitalist Discharge Summary     Patient ID:  Gwendloyn Rosio  [id number]  75 y.o.  9/13/1931    Admit date: 11/4/2022    Discharge date and time: 11/10/2022    Admission Diagnoses: Bilateral ankle fractures [S82.891A, S82.892A]  Closed patellar sleeve fracture of right knee [S82.091A]    Discharge Diagnoses: Active Problems:    Bilateral ankle fractures (11/4/2022)      Closed patellar sleeve fracture of right knee (11/4/2022)           Hospital Course:   Ms. Sommer Parker is a 80 y.o. female with history that includes DM 2, dysphagia, HTN, hypothyroid and history of ankle sprains using walker since 12/2021 presents after GLF at home felt that ankles gave out on her causing to have GLF at home. Fall resulted in bilateral ankle and right knee pain. Right knee and ankles found to be fractured. She was admitted for further evaluation and treatment of the following:         Bilateral ankle fractures: Acute left nondisplaced  ankle fracture / Acute nondisplaced ankle fracture due to home GLF. Left ankle nondisplaced fractures of distal tibial and fibular diametaphyses and distal tibial metaphysis  Right ankle nondisplaced fractures of the medial and lateral malleoli  Non-operative management per ortho       Acute bilateral knee fractures: Acute right patellar nondisplaced closed fracture d/t GLF at home. Left knee nondisplaced fracture proximal tibial diametaphysis. Right knee nondisplaced transverse fracture of patella. Conservative therapy and pain control as above       ABLA due to fractures. Acute bacterial UTI:  Not the cleanest catch and minimal symptoms but given the fall at home we will go ahead and treat in case is causing weakness  Urine culture Proteus. Completes Rocephin day 3. Tolerated it well  . GLF at home resulting in above fractures. Acquired hypothyroidism. TSH wnL. Continue levothyroxine 56 mcg p.o. daily. Dysphagia. Soft mechanical diet.      DM 2:   BG monitoring and ISS coverage    PCP: None     Consults: Orthopedic Surgery    Condition of patient at discharge: good and improved    Discharge Exam:    Physical Exam:    Gen: Well-developed, well-nourished, in no acute distress  HEENT:  Pink conjunctivae, PERRL, hearing intact to voice, moist mucous membranes  Neck: Supple, without masses, thyroid non-tender  Resp: No accessory muscle use, clear breath sounds without wheezes rales or rhonchi  Card: No murmurs, normal S1, S2 without thrills, bruits or peripheral edema  Abd:  Soft, non-tender, non-distended, normoactive bowel sounds are present, no palpable organomegaly and no detectable hernias  Lymph:  No cervical or inguinal adenopathy  Musc: No cyanosis or clubbing  Skin: No rashes or ulcers, skin turgor is good  Neuro:  Cranial nerves are grossly intact, no focal motor weakness, follows commands appropriately  Psych:  Good insight, oriented to person, place and time, alert          Disposition: SNF    Patient Instructions:   Cannot display discharge medications since this patient is not currently admitted. Activity: NWB bilat LE  Diet: Regular Diet  Wound Care: None needed    Approximate time spent in patient care on day of discharge: 20 min    Signed:   Som Gonzalez MD  11/10/2022  7:53 PM

## (undated) DEVICE — BASIN EMSIS 16OZ GRAPHITE PLAS KID SHP MOLD GRAD FOR ORAL

## (undated) DEVICE — SYRINGE 50ML E/T